# Patient Record
Sex: MALE | Race: OTHER | HISPANIC OR LATINO | Employment: OTHER | URBAN - METROPOLITAN AREA
[De-identification: names, ages, dates, MRNs, and addresses within clinical notes are randomized per-mention and may not be internally consistent; named-entity substitution may affect disease eponyms.]

---

## 2017-01-01 ENCOUNTER — HOSPITAL ENCOUNTER (INPATIENT)
Facility: MEDICAL CENTER | Age: 77
LOS: 3 days | DRG: 378 | End: 2017-03-12
Attending: EMERGENCY MEDICINE | Admitting: INTERNAL MEDICINE
Payer: MEDICARE

## 2017-01-01 ENCOUNTER — HOSPITAL ENCOUNTER (OUTPATIENT)
Facility: MEDICAL CENTER | Age: 77
End: 2017-10-17
Attending: EMERGENCY MEDICINE | Admitting: INTERNAL MEDICINE
Payer: MEDICARE

## 2017-01-01 ENCOUNTER — RESOLUTE PROFESSIONAL BILLING HOSPITAL PROF FEE (OUTPATIENT)
Dept: HOSPITALIST | Facility: MEDICAL CENTER | Age: 77
End: 2017-01-01
Payer: MEDICARE

## 2017-01-01 ENCOUNTER — APPOINTMENT (OUTPATIENT)
Dept: RADIOLOGY | Facility: MEDICAL CENTER | Age: 77
DRG: 378 | End: 2017-01-01
Attending: EMERGENCY MEDICINE
Payer: MEDICARE

## 2017-01-01 ENCOUNTER — APPOINTMENT (OUTPATIENT)
Dept: RADIOLOGY | Facility: MEDICAL CENTER | Age: 77
End: 2017-01-01
Attending: EMERGENCY MEDICINE
Payer: MEDICARE

## 2017-01-01 VITALS
WEIGHT: 122.8 LBS | TEMPERATURE: 98.7 F | DIASTOLIC BLOOD PRESSURE: 49 MMHG | BODY MASS INDEX: 19.73 KG/M2 | SYSTOLIC BLOOD PRESSURE: 84 MMHG | RESPIRATION RATE: 34 BRPM | HEART RATE: 127 BPM | HEIGHT: 66 IN | OXYGEN SATURATION: 54 %

## 2017-01-01 VITALS
SYSTOLIC BLOOD PRESSURE: 109 MMHG | DIASTOLIC BLOOD PRESSURE: 90 MMHG | RESPIRATION RATE: 19 BRPM | HEART RATE: 92 BPM | WEIGHT: 122.8 LBS | BODY MASS INDEX: 19.73 KG/M2 | TEMPERATURE: 97.9 F | OXYGEN SATURATION: 91 % | HEIGHT: 66 IN

## 2017-01-01 DIAGNOSIS — K92.2 UPPER GI BLEEDING: ICD-10-CM

## 2017-01-01 DIAGNOSIS — R73.9 HYPERGLYCEMIA: ICD-10-CM

## 2017-01-01 DIAGNOSIS — N17.9 AKI (ACUTE KIDNEY INJURY) (HCC): ICD-10-CM

## 2017-01-01 DIAGNOSIS — R06.02 SHORTNESS OF BREATH: ICD-10-CM

## 2017-01-01 DIAGNOSIS — R79.89 ELEVATED LACTIC ACID LEVEL: ICD-10-CM

## 2017-01-01 DIAGNOSIS — F02.818 LATE ONSET ALZHEIMER'S DISEASE WITH BEHAVIORAL DISTURBANCE (HCC): ICD-10-CM

## 2017-01-01 DIAGNOSIS — E87.29 INCREASED ANION GAP METABOLIC ACIDOSIS: ICD-10-CM

## 2017-01-01 DIAGNOSIS — D64.89 ANEMIA DUE TO OTHER CAUSE: ICD-10-CM

## 2017-01-01 DIAGNOSIS — J18.9 PNEUMONIA OF RIGHT LOWER LOBE DUE TO INFECTIOUS ORGANISM: ICD-10-CM

## 2017-01-01 DIAGNOSIS — R09.02 HYPOXIA: ICD-10-CM

## 2017-01-01 DIAGNOSIS — K92.2 GASTROINTESTINAL HEMORRHAGE, UNSPECIFIED GASTROINTESTINAL HEMORRHAGE TYPE: ICD-10-CM

## 2017-01-01 DIAGNOSIS — I95.89 OTHER SPECIFIED HYPOTENSION: ICD-10-CM

## 2017-01-01 DIAGNOSIS — D64.9 CHRONIC ANEMIA: ICD-10-CM

## 2017-01-01 DIAGNOSIS — G30.1 LATE ONSET ALZHEIMER'S DISEASE WITH BEHAVIORAL DISTURBANCE (HCC): ICD-10-CM

## 2017-01-01 LAB
ABO GROUP BLD: NORMAL
ALBUMIN SERPL BCP-MCNC: 2.9 G/DL (ref 3.2–4.9)
ALBUMIN SERPL BCP-MCNC: 3.1 G/DL (ref 3.2–4.9)
ALBUMIN SERPL BCP-MCNC: 3.5 G/DL (ref 3.2–4.9)
ALBUMIN SERPL BCP-MCNC: 4.1 G/DL (ref 3.2–4.9)
ALBUMIN/GLOB SERPL: 1 G/DL
ALBUMIN/GLOB SERPL: 1 G/DL
ALBUMIN/GLOB SERPL: 1.1 G/DL
ALBUMIN/GLOB SERPL: 1.1 G/DL
ALP SERPL-CCNC: 64 U/L (ref 30–99)
ALP SERPL-CCNC: 66 U/L (ref 30–99)
ALP SERPL-CCNC: 67 U/L (ref 30–99)
ALP SERPL-CCNC: 76 U/L (ref 30–99)
ALT SERPL-CCNC: 31 U/L (ref 2–50)
ALT SERPL-CCNC: 35 U/L (ref 2–50)
ALT SERPL-CCNC: 38 U/L (ref 2–50)
ALT SERPL-CCNC: 54 U/L (ref 2–50)
ANION GAP SERPL CALC-SCNC: 10 MMOL/L (ref 0–11.9)
ANION GAP SERPL CALC-SCNC: 20 MMOL/L (ref 0–11.9)
ANION GAP SERPL CALC-SCNC: 8 MMOL/L (ref 0–11.9)
ANION GAP SERPL CALC-SCNC: 9 MMOL/L (ref 0–11.9)
ANION GAP SERPL CALC-SCNC: 9 MMOL/L (ref 0–11.9)
ANISOCYTOSIS BLD QL SMEAR: ABNORMAL
AST SERPL-CCNC: 11 U/L (ref 12–45)
AST SERPL-CCNC: 16 U/L (ref 12–45)
AST SERPL-CCNC: 16 U/L (ref 12–45)
AST SERPL-CCNC: 19 U/L (ref 12–45)
BARCODED ABORH UBTYP: 6200
BARCODED PRD CODE UBPRD: NORMAL
BARCODED UNIT NUM UBUNT: NORMAL
BASOPHILS # BLD AUTO: 0 % (ref 0–1.8)
BASOPHILS # BLD AUTO: 0 % (ref 0–1.8)
BASOPHILS # BLD AUTO: 0.7 % (ref 0–1.8)
BASOPHILS # BLD AUTO: 0.9 % (ref 0–1.8)
BASOPHILS # BLD AUTO: 0.9 % (ref 0–1.8)
BASOPHILS # BLD AUTO: 1.1 % (ref 0–1.8)
BASOPHILS # BLD: 0 K/UL (ref 0–0.12)
BASOPHILS # BLD: 0 K/UL (ref 0–0.12)
BASOPHILS # BLD: 0.04 K/UL (ref 0–0.12)
BASOPHILS # BLD: 0.05 K/UL (ref 0–0.12)
BASOPHILS # BLD: 0.06 K/UL (ref 0–0.12)
BASOPHILS # BLD: 0.07 K/UL (ref 0–0.12)
BILIRUB SERPL-MCNC: 0.1 MG/DL (ref 0.1–1.5)
BILIRUB SERPL-MCNC: 0.2 MG/DL (ref 0.1–1.5)
BLD GP AB SCN SERPL QL: NORMAL
BNP SERPL-MCNC: 59 PG/ML (ref 0–100)
BUN SERPL-MCNC: 11 MG/DL (ref 8–22)
BUN SERPL-MCNC: 12 MG/DL (ref 8–22)
BUN SERPL-MCNC: 21 MG/DL (ref 8–22)
BUN SERPL-MCNC: 23 MG/DL (ref 8–22)
BUN SERPL-MCNC: 59 MG/DL (ref 8–22)
CALCIUM SERPL-MCNC: 8.4 MG/DL (ref 8.5–10.5)
CALCIUM SERPL-MCNC: 8.5 MG/DL (ref 8.5–10.5)
CALCIUM SERPL-MCNC: 8.6 MG/DL (ref 8.5–10.5)
CALCIUM SERPL-MCNC: 8.9 MG/DL (ref 8.5–10.5)
CALCIUM SERPL-MCNC: 9.5 MG/DL (ref 8.5–10.5)
CEA SERPL-MCNC: 0.8 NG/ML (ref 0–3)
CHLORIDE SERPL-SCNC: 103 MMOL/L (ref 96–112)
CHLORIDE SERPL-SCNC: 104 MMOL/L (ref 96–112)
CHLORIDE SERPL-SCNC: 106 MMOL/L (ref 96–112)
CHLORIDE SERPL-SCNC: 108 MMOL/L (ref 96–112)
CHLORIDE SERPL-SCNC: 113 MMOL/L (ref 96–112)
CO2 SERPL-SCNC: 13 MMOL/L (ref 20–33)
CO2 SERPL-SCNC: 22 MMOL/L (ref 20–33)
CO2 SERPL-SCNC: 23 MMOL/L (ref 20–33)
CO2 SERPL-SCNC: 24 MMOL/L (ref 20–33)
CO2 SERPL-SCNC: 24 MMOL/L (ref 20–33)
COMPONENT R 8504R: NORMAL
CREAT SERPL-MCNC: 0.54 MG/DL (ref 0.5–1.4)
CREAT SERPL-MCNC: 0.59 MG/DL (ref 0.5–1.4)
CREAT SERPL-MCNC: 0.68 MG/DL (ref 0.5–1.4)
CREAT SERPL-MCNC: 0.69 MG/DL (ref 0.5–1.4)
CREAT SERPL-MCNC: 1.32 MG/DL (ref 0.5–1.4)
EKG IMPRESSION: NORMAL
EOSINOPHIL # BLD AUTO: 0 K/UL (ref 0–0.51)
EOSINOPHIL # BLD AUTO: 0.12 K/UL (ref 0–0.51)
EOSINOPHIL # BLD AUTO: 0.13 K/UL (ref 0–0.51)
EOSINOPHIL # BLD AUTO: 0.15 K/UL (ref 0–0.51)
EOSINOPHIL # BLD AUTO: 0.2 K/UL (ref 0–0.51)
EOSINOPHIL # BLD AUTO: 0.22 K/UL (ref 0–0.51)
EOSINOPHIL NFR BLD: 0 % (ref 0–6.9)
EOSINOPHIL NFR BLD: 1.7 % (ref 0–6.9)
EOSINOPHIL NFR BLD: 2.4 % (ref 0–6.9)
EOSINOPHIL NFR BLD: 2.6 % (ref 0–6.9)
EOSINOPHIL NFR BLD: 2.7 % (ref 0–6.9)
EOSINOPHIL NFR BLD: 4.2 % (ref 0–6.9)
ERYTHROCYTE [DISTWIDTH] IN BLOOD BY AUTOMATED COUNT: 46.5 FL (ref 35.9–50)
ERYTHROCYTE [DISTWIDTH] IN BLOOD BY AUTOMATED COUNT: 46.5 FL (ref 35.9–50)
ERYTHROCYTE [DISTWIDTH] IN BLOOD BY AUTOMATED COUNT: 47.3 FL (ref 35.9–50)
ERYTHROCYTE [DISTWIDTH] IN BLOOD BY AUTOMATED COUNT: 47.3 FL (ref 35.9–50)
ERYTHROCYTE [DISTWIDTH] IN BLOOD BY AUTOMATED COUNT: 47.5 FL (ref 35.9–50)
ERYTHROCYTE [DISTWIDTH] IN BLOOD BY AUTOMATED COUNT: 48.1 FL (ref 35.9–50)
ERYTHROCYTE [DISTWIDTH] IN BLOOD BY AUTOMATED COUNT: 56.3 FL (ref 35.9–50)
EST. AVERAGE GLUCOSE BLD GHB EST-MCNC: 123 MG/DL
FERRITIN SERPL-MCNC: 6.6 NG/ML (ref 22–322)
GFR SERPL CREATININE-BSD FRML MDRD: 53 ML/MIN/1.73 M 2
GFR SERPL CREATININE-BSD FRML MDRD: >60 ML/MIN/1.73 M 2
GLOBULIN SER CALC-MCNC: 2.7 G/DL (ref 1.9–3.5)
GLOBULIN SER CALC-MCNC: 2.9 G/DL (ref 1.9–3.5)
GLOBULIN SER CALC-MCNC: 3.5 G/DL (ref 1.9–3.5)
GLOBULIN SER CALC-MCNC: 4.1 G/DL (ref 1.9–3.5)
GLUCOSE BLD-MCNC: 102 MG/DL (ref 65–99)
GLUCOSE BLD-MCNC: 115 MG/DL (ref 65–99)
GLUCOSE BLD-MCNC: 116 MG/DL (ref 65–99)
GLUCOSE BLD-MCNC: 170 MG/DL (ref 65–99)
GLUCOSE BLD-MCNC: 93 MG/DL (ref 65–99)
GLUCOSE BLD-MCNC: 94 MG/DL (ref 65–99)
GLUCOSE BLD-MCNC: 99 MG/DL (ref 65–99)
GLUCOSE BLD-MCNC: 99 MG/DL (ref 65–99)
GLUCOSE SERPL-MCNC: 103 MG/DL (ref 65–99)
GLUCOSE SERPL-MCNC: 109 MG/DL (ref 65–99)
GLUCOSE SERPL-MCNC: 142 MG/DL (ref 65–99)
GLUCOSE SERPL-MCNC: 419 MG/DL (ref 65–99)
GLUCOSE SERPL-MCNC: 86 MG/DL (ref 65–99)
HBA1C MFR BLD: 5.9 % (ref 0–5.6)
HCT VFR BLD AUTO: 22.4 % (ref 42–52)
HCT VFR BLD AUTO: 22.7 % (ref 42–52)
HCT VFR BLD AUTO: 22.8 % (ref 42–52)
HCT VFR BLD AUTO: 26.8 % (ref 42–52)
HCT VFR BLD AUTO: 27.4 % (ref 42–52)
HCT VFR BLD AUTO: 27.4 % (ref 42–52)
HCT VFR BLD AUTO: 28.4 % (ref 42–52)
HCT VFR BLD AUTO: 28.5 % (ref 42–52)
HGB BLD-MCNC: 6.2 G/DL (ref 14–18)
HGB BLD-MCNC: 6.4 G/DL (ref 14–18)
HGB BLD-MCNC: 6.4 G/DL (ref 14–18)
HGB BLD-MCNC: 7.4 G/DL (ref 14–18)
HGB BLD-MCNC: 7.6 G/DL (ref 14–18)
HGB BLD-MCNC: 7.6 G/DL (ref 14–18)
HGB BLD-MCNC: 7.7 G/DL (ref 14–18)
HGB BLD-MCNC: 8.4 G/DL (ref 14–18)
HGB RETIC QN AUTO: 18.8 PG/CELL (ref 29–35)
HYPOCHROMIA BLD QL SMEAR: ABNORMAL
HYPOCHROMIA BLD QL SMEAR: ABNORMAL
IMM GRANULOCYTES # BLD AUTO: 0.01 K/UL (ref 0–0.11)
IMM GRANULOCYTES # BLD AUTO: 0.02 K/UL (ref 0–0.11)
IMM GRANULOCYTES # BLD AUTO: 0.02 K/UL (ref 0–0.11)
IMM GRANULOCYTES # BLD AUTO: 0.05 K/UL (ref 0–0.11)
IMM GRANULOCYTES NFR BLD AUTO: 0.2 % (ref 0–0.9)
IMM GRANULOCYTES NFR BLD AUTO: 0.4 % (ref 0–0.9)
IMM GRANULOCYTES NFR BLD AUTO: 0.4 % (ref 0–0.9)
IMM GRANULOCYTES NFR BLD AUTO: 0.6 % (ref 0–0.9)
IMM RETICS NFR: 24.4 % (ref 9.3–17.4)
IRON SATN MFR SERPL: ABNORMAL % (ref 15–55)
IRON SERPL-MCNC: <10 UG/DL (ref 50–180)
LACTATE BLD-SCNC: 12.7 MMOL/L (ref 0.5–2)
LG PLATELETS BLD QL SMEAR: NORMAL
LYMPHOCYTES # BLD AUTO: 1.15 K/UL (ref 1–4.8)
LYMPHOCYTES # BLD AUTO: 1.29 K/UL (ref 1–4.8)
LYMPHOCYTES # BLD AUTO: 1.3 K/UL (ref 1–4.8)
LYMPHOCYTES # BLD AUTO: 1.33 K/UL (ref 1–4.8)
LYMPHOCYTES # BLD AUTO: 1.46 K/UL (ref 1–4.8)
LYMPHOCYTES # BLD AUTO: 2.36 K/UL (ref 1–4.8)
LYMPHOCYTES NFR BLD: 14.9 % (ref 22–41)
LYMPHOCYTES NFR BLD: 19.1 % (ref 22–41)
LYMPHOCYTES NFR BLD: 20.2 % (ref 22–41)
LYMPHOCYTES NFR BLD: 23.6 % (ref 22–41)
LYMPHOCYTES NFR BLD: 24 % (ref 22–41)
LYMPHOCYTES NFR BLD: 27.5 % (ref 22–41)
MACROCYTES BLD QL SMEAR: ABNORMAL
MAGNESIUM SERPL-MCNC: 2.1 MG/DL (ref 1.5–2.5)
MAGNESIUM SERPL-MCNC: 2.8 MG/DL (ref 1.5–2.5)
MANUAL DIFF BLD: ABNORMAL
MANUAL DIFF BLD: NORMAL
MCH RBC QN AUTO: 20.3 PG (ref 27–33)
MCH RBC QN AUTO: 20.4 PG (ref 27–33)
MCH RBC QN AUTO: 20.5 PG (ref 27–33)
MCH RBC QN AUTO: 20.7 PG (ref 27–33)
MCH RBC QN AUTO: 21 PG (ref 27–33)
MCH RBC QN AUTO: 22.1 PG (ref 27–33)
MCH RBC QN AUTO: 26.8 PG (ref 27–33)
MCHC RBC AUTO-ENTMCNC: 27 G/DL (ref 33.7–35.3)
MCHC RBC AUTO-ENTMCNC: 27 G/DL (ref 33.7–35.3)
MCHC RBC AUTO-ENTMCNC: 27.6 G/DL (ref 33.7–35.3)
MCHC RBC AUTO-ENTMCNC: 27.7 G/DL (ref 33.7–35.3)
MCHC RBC AUTO-ENTMCNC: 28.1 G/DL (ref 33.7–35.3)
MCHC RBC AUTO-ENTMCNC: 28.2 G/DL (ref 33.7–35.3)
MCHC RBC AUTO-ENTMCNC: 29.6 G/DL (ref 33.7–35.3)
MCV RBC AUTO: 73.7 FL (ref 81.4–97.8)
MCV RBC AUTO: 73.8 FL (ref 81.4–97.8)
MCV RBC AUTO: 74.2 FL (ref 81.4–97.8)
MCV RBC AUTO: 74.4 FL (ref 81.4–97.8)
MCV RBC AUTO: 74.7 FL (ref 81.4–97.8)
MCV RBC AUTO: 75.1 FL (ref 81.4–97.8)
MCV RBC AUTO: 99.3 FL (ref 81.4–97.8)
METAMYELOCYTES NFR BLD MANUAL: 0.9 %
MICROCYTES BLD QL SMEAR: ABNORMAL
MONOCYTES # BLD AUTO: 0.24 K/UL (ref 0–0.85)
MONOCYTES # BLD AUTO: 0.51 K/UL (ref 0–0.85)
MONOCYTES # BLD AUTO: 0.66 K/UL (ref 0–0.85)
MONOCYTES # BLD AUTO: 0.75 K/UL (ref 0–0.85)
MONOCYTES # BLD AUTO: 0.8 K/UL (ref 0–0.85)
MONOCYTES # BLD AUTO: 0.96 K/UL (ref 0–0.85)
MONOCYTES NFR BLD AUTO: 12.5 % (ref 0–13.4)
MONOCYTES NFR BLD AUTO: 12.5 % (ref 0–13.4)
MONOCYTES NFR BLD AUTO: 13.5 % (ref 0–13.4)
MONOCYTES NFR BLD AUTO: 14.6 % (ref 0–13.4)
MONOCYTES NFR BLD AUTO: 3.5 % (ref 0–13.4)
MONOCYTES NFR BLD AUTO: 4.4 % (ref 0–13.4)
MORPHOLOGY BLD-IMP: NORMAL
MORPHOLOGY BLD-IMP: NORMAL
NEUTROPHILS # BLD AUTO: 2.89 K/UL (ref 1.82–7.42)
NEUTROPHILS # BLD AUTO: 3.17 K/UL (ref 1.82–7.42)
NEUTROPHILS # BLD AUTO: 3.27 K/UL (ref 1.82–7.42)
NEUTROPHILS # BLD AUTO: 5.15 K/UL (ref 1.82–7.42)
NEUTROPHILS # BLD AUTO: 5.28 K/UL (ref 1.82–7.42)
NEUTROPHILS # BLD AUTO: 8.72 K/UL (ref 1.82–7.42)
NEUTROPHILS NFR BLD: 54.5 % (ref 44–72)
NEUTROPHILS NFR BLD: 57.9 % (ref 44–72)
NEUTROPHILS NFR BLD: 58.9 % (ref 44–72)
NEUTROPHILS NFR BLD: 64 % (ref 44–72)
NEUTROPHILS NFR BLD: 68.5 % (ref 44–72)
NEUTROPHILS NFR BLD: 75.7 % (ref 44–72)
NEUTS BAND NFR BLD MANUAL: 10.5 % (ref 0–10)
NRBC # BLD AUTO: 0 K/UL
NRBC # BLD AUTO: 0.12 K/UL
NRBC BLD AUTO-RTO: 0 /100 WBC
NRBC BLD AUTO-RTO: 1 /100 WBC
OVALOCYTES BLD QL SMEAR: NORMAL
OVALOCYTES BLD QL SMEAR: NORMAL
PHENYTOIN SERPL-MCNC: 2 UG/ML (ref 10–20)
PHOSPHATE SERPL-MCNC: 2.7 MG/DL (ref 2.5–4.5)
PHOSPHATE SERPL-MCNC: 3.6 MG/DL (ref 2.5–4.5)
PLATELET # BLD AUTO: 276 K/UL (ref 164–446)
PLATELET # BLD AUTO: 278 K/UL (ref 164–446)
PLATELET # BLD AUTO: 280 K/UL (ref 164–446)
PLATELET # BLD AUTO: 294 K/UL (ref 164–446)
PLATELET # BLD AUTO: 299 K/UL (ref 164–446)
PLATELET # BLD AUTO: 304 K/UL (ref 164–446)
PLATELET # BLD AUTO: 328 K/UL (ref 164–446)
PLATELET BLD QL SMEAR: NORMAL
PLATELET BLD QL SMEAR: NORMAL
PMV BLD AUTO: 10.5 FL (ref 9–12.9)
PMV BLD AUTO: 12.6 FL (ref 9–12.9)
PMV BLD AUTO: 9.5 FL (ref 9–12.9)
PMV BLD AUTO: 9.7 FL (ref 9–12.9)
PMV BLD AUTO: 9.7 FL (ref 9–12.9)
PMV BLD AUTO: 9.8 FL (ref 9–12.9)
PMV BLD AUTO: 9.8 FL (ref 9–12.9)
POIKILOCYTOSIS BLD QL SMEAR: NORMAL
POIKILOCYTOSIS BLD QL SMEAR: NORMAL
POTASSIUM SERPL-SCNC: 3.8 MMOL/L (ref 3.6–5.5)
POTASSIUM SERPL-SCNC: 4 MMOL/L (ref 3.6–5.5)
POTASSIUM SERPL-SCNC: 4.4 MMOL/L (ref 3.6–5.5)
POTASSIUM SERPL-SCNC: 4.5 MMOL/L (ref 3.6–5.5)
POTASSIUM SERPL-SCNC: 4.6 MMOL/L (ref 3.6–5.5)
PRODUCT TYPE UPROD: NORMAL
PROT SERPL-MCNC: 5.6 G/DL (ref 6–8.2)
PROT SERPL-MCNC: 6 G/DL (ref 6–8.2)
PROT SERPL-MCNC: 7 G/DL (ref 6–8.2)
PROT SERPL-MCNC: 8.2 G/DL (ref 6–8.2)
RBC # BLD AUTO: 2.87 M/UL (ref 4.7–6.1)
RBC # BLD AUTO: 3.04 M/UL (ref 4.7–6.1)
RBC # BLD AUTO: 3.05 M/UL (ref 4.7–6.1)
RBC # BLD AUTO: 3.09 M/UL (ref 4.7–6.1)
RBC # BLD AUTO: 3.61 M/UL (ref 4.7–6.1)
RBC # BLD AUTO: 3.74 M/UL (ref 4.7–6.1)
RBC # BLD AUTO: 3.8 M/UL (ref 4.7–6.1)
RBC BLD AUTO: PRESENT
RBC BLD AUTO: PRESENT
RETICS # AUTO: 0.05 M/UL (ref 0.04–0.06)
RETICS/RBC NFR: 1.7 % (ref 0.8–2.1)
RH BLD: NORMAL
SCHISTOCYTES BLD QL SMEAR: NORMAL
SCHISTOCYTES BLD QL SMEAR: NORMAL
SODIUM SERPL-SCNC: 136 MMOL/L (ref 135–145)
SODIUM SERPL-SCNC: 137 MMOL/L (ref 135–145)
SODIUM SERPL-SCNC: 138 MMOL/L (ref 135–145)
SODIUM SERPL-SCNC: 139 MMOL/L (ref 135–145)
SODIUM SERPL-SCNC: 146 MMOL/L (ref 135–145)
TIBC SERPL-MCNC: 370 UG/DL (ref 250–450)
TROPONIN I SERPL-MCNC: 0.02 NG/ML (ref 0–0.04)
UNIT STATUS USTAT: NORMAL
WBC # BLD AUTO: 11.7 K/UL (ref 4.8–10.8)
WBC # BLD AUTO: 5.3 K/UL (ref 4.8–10.8)
WBC # BLD AUTO: 5.5 K/UL (ref 4.8–10.8)
WBC # BLD AUTO: 5.6 K/UL (ref 4.8–10.8)
WBC # BLD AUTO: 5.9 K/UL (ref 4.8–10.8)
WBC # BLD AUTO: 6.8 K/UL (ref 4.8–10.8)
WBC # BLD AUTO: 7.7 K/UL (ref 4.8–10.8)

## 2017-01-01 PROCEDURE — 36415 COLL VENOUS BLD VENIPUNCTURE: CPT

## 2017-01-01 PROCEDURE — 80053 COMPREHEN METABOLIC PANEL: CPT

## 2017-01-01 PROCEDURE — 82728 ASSAY OF FERRITIN: CPT

## 2017-01-01 PROCEDURE — 83605 ASSAY OF LACTIC ACID: CPT

## 2017-01-01 PROCEDURE — 86923 COMPATIBILITY TEST ELECTRIC: CPT

## 2017-01-01 PROCEDURE — 71010 DX-CHEST-LIMITED (1 VIEW): CPT

## 2017-01-01 PROCEDURE — 85027 COMPLETE CBC AUTOMATED: CPT

## 2017-01-01 PROCEDURE — 85025 COMPLETE CBC W/AUTO DIFF WBC: CPT

## 2017-01-01 PROCEDURE — C9113 INJ PANTOPRAZOLE SODIUM, VIA: HCPCS | Performed by: INTERNAL MEDICINE

## 2017-01-01 PROCEDURE — 700105 HCHG RX REV CODE 258: Performed by: INTERNAL MEDICINE

## 2017-01-01 PROCEDURE — 700102 HCHG RX REV CODE 250 W/ 637 OVERRIDE(OP): Performed by: INTERNAL MEDICINE

## 2017-01-01 PROCEDURE — G0378 HOSPITAL OBSERVATION PER HR: HCPCS

## 2017-01-01 PROCEDURE — 99223 1ST HOSP IP/OBS HIGH 75: CPT | Mod: AI | Performed by: INTERNAL MEDICINE

## 2017-01-01 PROCEDURE — 700102 HCHG RX REV CODE 250 W/ 637 OVERRIDE(OP)

## 2017-01-01 PROCEDURE — 84484 ASSAY OF TROPONIN QUANT: CPT

## 2017-01-01 PROCEDURE — 86850 RBC ANTIBODY SCREEN: CPT

## 2017-01-01 PROCEDURE — 83880 ASSAY OF NATRIURETIC PEPTIDE: CPT

## 2017-01-01 PROCEDURE — 700105 HCHG RX REV CODE 258: Performed by: EMERGENCY MEDICINE

## 2017-01-01 PROCEDURE — 304561 HCHG STAT O2

## 2017-01-01 PROCEDURE — 83735 ASSAY OF MAGNESIUM: CPT

## 2017-01-01 PROCEDURE — 82378 CARCINOEMBRYONIC ANTIGEN: CPT

## 2017-01-01 PROCEDURE — 160002 HCHG RECOVERY MINUTES (STAT): Performed by: INTERNAL MEDICINE

## 2017-01-01 PROCEDURE — A9270 NON-COVERED ITEM OR SERVICE: HCPCS | Performed by: INTERNAL MEDICINE

## 2017-01-01 PROCEDURE — 770020 HCHG ROOM/CARE - TELE (206)

## 2017-01-01 PROCEDURE — 85007 BL SMEAR W/DIFF WBC COUNT: CPT

## 2017-01-01 PROCEDURE — 160035 HCHG PACU - 1ST 60 MINS PHASE I: Performed by: INTERNAL MEDICINE

## 2017-01-01 PROCEDURE — 160048 HCHG OR STATISTICAL LEVEL 1-5: Performed by: INTERNAL MEDICINE

## 2017-01-01 PROCEDURE — 36430 TRANSFUSION BLD/BLD COMPNT: CPT

## 2017-01-01 PROCEDURE — 86900 BLOOD TYPING SEROLOGIC ABO: CPT

## 2017-01-01 PROCEDURE — 83550 IRON BINDING TEST: CPT

## 2017-01-01 PROCEDURE — 700111 HCHG RX REV CODE 636 W/ 250 OVERRIDE (IP)

## 2017-01-01 PROCEDURE — 31720 CLEARANCE OF AIRWAYS: CPT

## 2017-01-01 PROCEDURE — 83036 HEMOGLOBIN GLYCOSYLATED A1C: CPT

## 2017-01-01 PROCEDURE — 700105 HCHG RX REV CODE 258

## 2017-01-01 PROCEDURE — 71010 DX-CHEST-PORTABLE (1 VIEW): CPT

## 2017-01-01 PROCEDURE — P9016 RBC LEUKOCYTES REDUCED: HCPCS

## 2017-01-01 PROCEDURE — A9270 NON-COVERED ITEM OR SERVICE: HCPCS

## 2017-01-01 PROCEDURE — 99238 HOSP IP/OBS DSCHRG MGMT 30/<: CPT | Mod: GC | Performed by: INTERNAL MEDICINE

## 2017-01-01 PROCEDURE — 83540 ASSAY OF IRON: CPT

## 2017-01-01 PROCEDURE — 93005 ELECTROCARDIOGRAM TRACING: CPT | Performed by: INTERNAL MEDICINE

## 2017-01-01 PROCEDURE — 80048 BASIC METABOLIC PNL TOTAL CA: CPT

## 2017-01-01 PROCEDURE — 99152 MOD SED SAME PHYS/QHP 5/>YRS: CPT | Performed by: INTERNAL MEDICINE

## 2017-01-01 PROCEDURE — 500066 HCHG BITE BLOCK, ECT: Performed by: INTERNAL MEDICINE

## 2017-01-01 PROCEDURE — 96375 TX/PRO/DX INJ NEW DRUG ADDON: CPT | Mod: XU

## 2017-01-01 PROCEDURE — 30233N1 TRANSFUSION OF NONAUTOLOGOUS RED BLOOD CELLS INTO PERIPHERAL VEIN, PERCUTANEOUS APPROACH: ICD-10-PCS | Performed by: INTERNAL MEDICINE

## 2017-01-01 PROCEDURE — 84100 ASSAY OF PHOSPHORUS: CPT

## 2017-01-01 PROCEDURE — 700111 HCHG RX REV CODE 636 W/ 250 OVERRIDE (IP): Performed by: INTERNAL MEDICINE

## 2017-01-01 PROCEDURE — 85046 RETICYTE/HGB CONCENTRATE: CPT

## 2017-01-01 PROCEDURE — 99232 SBSQ HOSP IP/OBS MODERATE 35: CPT | Mod: GC | Performed by: INTERNAL MEDICINE

## 2017-01-01 PROCEDURE — 80185 ASSAY OF PHENYTOIN TOTAL: CPT

## 2017-01-01 PROCEDURE — 0DJ08ZZ INSPECTION OF UPPER INTESTINAL TRACT, VIA NATURAL OR ARTIFICIAL OPENING ENDOSCOPIC: ICD-10-PCS | Performed by: INTERNAL MEDICINE

## 2017-01-01 PROCEDURE — 85025 COMPLETE CBC W/AUTO DIFF WBC: CPT | Mod: 91

## 2017-01-01 PROCEDURE — 93010 ELECTROCARDIOGRAM REPORT: CPT | Performed by: INTERNAL MEDICINE

## 2017-01-01 PROCEDURE — 82962 GLUCOSE BLOOD TEST: CPT

## 2017-01-01 PROCEDURE — 87040 BLOOD CULTURE FOR BACTERIA: CPT

## 2017-01-01 PROCEDURE — 94760 N-INVAS EAR/PLS OXIMETRY 1: CPT

## 2017-01-01 PROCEDURE — 160203 HCHG ENDO MINUTES - 1ST 30 MINS LEVEL 4: Performed by: INTERNAL MEDICINE

## 2017-01-01 PROCEDURE — 82962 GLUCOSE BLOOD TEST: CPT | Mod: 91

## 2017-01-01 PROCEDURE — 99291 CRITICAL CARE FIRST HOUR: CPT

## 2017-01-01 PROCEDURE — 94640 AIRWAY INHALATION TREATMENT: CPT

## 2017-01-01 PROCEDURE — 99285 EMERGENCY DEPT VISIT HI MDM: CPT

## 2017-01-01 PROCEDURE — 86901 BLOOD TYPING SEROLOGIC RH(D): CPT

## 2017-01-01 PROCEDURE — 700111 HCHG RX REV CODE 636 W/ 250 OVERRIDE (IP): Performed by: EMERGENCY MEDICINE

## 2017-01-01 PROCEDURE — 96365 THER/PROPH/DIAG IV INF INIT: CPT | Mod: XU

## 2017-01-01 RX ORDER — CHOLECALCIFEROL (VITAMIN D3) 125 MCG
500 CAPSULE ORAL DAILY
Status: DISCONTINUED | OUTPATIENT
Start: 2017-01-01 | End: 2017-01-01 | Stop reason: HOSPADM

## 2017-01-01 RX ORDER — ATROPINE SULFATE 10 MG/ML
2 SOLUTION/ DROPS OPHTHALMIC EVERY 4 HOURS PRN
Status: DISCONTINUED | OUTPATIENT
Start: 2017-01-01 | End: 2017-01-01 | Stop reason: HOSPADM

## 2017-01-01 RX ORDER — FOLIC ACID 1 MG/1
1 TABLET ORAL DAILY
Status: DISCONTINUED | OUTPATIENT
Start: 2017-01-01 | End: 2017-01-01 | Stop reason: HOSPADM

## 2017-01-01 RX ORDER — LORAZEPAM 2 MG/ML
1 INJECTION INTRAMUSCULAR
Status: DISCONTINUED | OUTPATIENT
Start: 2017-01-01 | End: 2017-01-01

## 2017-01-01 RX ORDER — PHENYTOIN SODIUM 100 MG/1
100 CAPSULE, EXTENDED RELEASE ORAL 2 TIMES DAILY
COMMUNITY

## 2017-01-01 RX ORDER — MORPHINE SULFATE 10 MG/ML
5 INJECTION, SOLUTION INTRAMUSCULAR; INTRAVENOUS
Status: DISCONTINUED | OUTPATIENT
Start: 2017-01-01 | End: 2017-01-01 | Stop reason: HOSPADM

## 2017-01-01 RX ORDER — LORAZEPAM 2 MG/ML
1 INJECTION INTRAMUSCULAR
Status: DISCONTINUED | OUTPATIENT
Start: 2017-01-01 | End: 2017-01-01 | Stop reason: HOSPADM

## 2017-01-01 RX ORDER — DIVALPROEX SODIUM 125 MG/1
125 CAPSULE, COATED PELLETS ORAL 3 TIMES DAILY
COMMUNITY
End: 2017-01-01

## 2017-01-01 RX ORDER — DIPHENHYDRAMINE HYDROCHLORIDE 50 MG/ML
25 INJECTION INTRAMUSCULAR; INTRAVENOUS ONCE
Status: COMPLETED | OUTPATIENT
Start: 2017-01-01 | End: 2017-01-01

## 2017-01-01 RX ORDER — LORAZEPAM 2 MG/ML
1 CONCENTRATE ORAL
Status: DISCONTINUED | OUTPATIENT
Start: 2017-01-01 | End: 2017-01-01

## 2017-01-01 RX ORDER — FERROUS SULFATE 325(65) MG
325 TABLET ORAL DAILY
COMMUNITY
End: 2017-01-01

## 2017-01-01 RX ORDER — TRAZODONE HYDROCHLORIDE 100 MG/1
100 TABLET ORAL
COMMUNITY

## 2017-01-01 RX ORDER — LISINOPRIL 20 MG/1
40 TABLET ORAL DAILY
Status: DISCONTINUED | OUTPATIENT
Start: 2017-01-01 | End: 2017-01-01 | Stop reason: HOSPADM

## 2017-01-01 RX ORDER — ACETAMINOPHEN 325 MG/1
650 TABLET ORAL EVERY 6 HOURS PRN
Status: DISCONTINUED | OUTPATIENT
Start: 2017-01-01 | End: 2017-01-01 | Stop reason: HOSPADM

## 2017-01-01 RX ORDER — LORAZEPAM 2 MG/ML
1 CONCENTRATE ORAL
Status: DISCONTINUED | OUTPATIENT
Start: 2017-01-01 | End: 2017-01-01 | Stop reason: HOSPADM

## 2017-01-01 RX ORDER — OMEPRAZOLE 20 MG/1
20 CAPSULE, DELAYED RELEASE ORAL DAILY
COMMUNITY
End: 2017-01-01

## 2017-01-01 RX ORDER — DIVALPROEX SODIUM 125 MG/1
125 CAPSULE, COATED PELLETS ORAL 3 TIMES DAILY
Status: DISCONTINUED | OUTPATIENT
Start: 2017-01-01 | End: 2017-01-01 | Stop reason: HOSPADM

## 2017-01-01 RX ORDER — MORPHINE SULFATE 4 MG/ML
4 INJECTION, SOLUTION INTRAMUSCULAR; INTRAVENOUS ONCE
Status: DISCONTINUED | OUTPATIENT
Start: 2017-01-01 | End: 2017-01-01

## 2017-01-01 RX ORDER — OMEPRAZOLE 20 MG/1
20 CAPSULE, DELAYED RELEASE ORAL 2 TIMES DAILY
Status: DISCONTINUED | OUTPATIENT
Start: 2017-01-01 | End: 2017-01-01 | Stop reason: HOSPADM

## 2017-01-01 RX ORDER — FOLIC ACID 1 MG/1
1 TABLET ORAL DAILY
COMMUNITY
End: 2017-01-01

## 2017-01-01 RX ORDER — FERROUS SULFATE 325(65) MG
325 TABLET ORAL DAILY
Status: DISCONTINUED | OUTPATIENT
Start: 2017-01-01 | End: 2017-01-01

## 2017-01-01 RX ORDER — SODIUM CHLORIDE 9 MG/ML
500 INJECTION, SOLUTION INTRAVENOUS PRN
Status: DISCONTINUED | OUTPATIENT
Start: 2017-01-01 | End: 2017-01-01 | Stop reason: HOSPADM

## 2017-01-01 RX ORDER — IPRATROPIUM BROMIDE AND ALBUTEROL SULFATE 2.5; .5 MG/3ML; MG/3ML
3 SOLUTION RESPIRATORY (INHALATION)
Status: DISCONTINUED | OUTPATIENT
Start: 2017-01-01 | End: 2017-01-01 | Stop reason: HOSPADM

## 2017-01-01 RX ORDER — ONDANSETRON 4 MG/1
4 TABLET, ORALLY DISINTEGRATING ORAL EVERY 4 HOURS PRN
Status: DISCONTINUED | OUTPATIENT
Start: 2017-01-01 | End: 2017-01-01 | Stop reason: HOSPADM

## 2017-01-01 RX ORDER — HALOPERIDOL 0.5 MG/1
0.5 TABLET ORAL EVERY 8 HOURS PRN
Status: DISCONTINUED | OUTPATIENT
Start: 2017-01-01 | End: 2017-01-01 | Stop reason: HOSPADM

## 2017-01-01 RX ORDER — SODIUM CHLORIDE 9 MG/ML
INJECTION, SOLUTION INTRAVENOUS CONTINUOUS
Status: DISCONTINUED | OUTPATIENT
Start: 2017-01-01 | End: 2017-01-01

## 2017-01-01 RX ORDER — MAGNESIUM OXIDE 400 MG/1
400 TABLET ORAL 2 TIMES DAILY
COMMUNITY

## 2017-01-01 RX ORDER — ONDANSETRON 2 MG/ML
4 INJECTION INTRAMUSCULAR; INTRAVENOUS EVERY 4 HOURS PRN
Status: DISCONTINUED | OUTPATIENT
Start: 2017-01-01 | End: 2017-01-01 | Stop reason: HOSPADM

## 2017-01-01 RX ORDER — MIDAZOLAM HYDROCHLORIDE 1 MG/ML
INJECTION INTRAMUSCULAR; INTRAVENOUS
Status: DISCONTINUED | OUTPATIENT
Start: 2017-01-01 | End: 2017-01-01 | Stop reason: HOSPADM

## 2017-01-01 RX ORDER — MORPHINE SULFATE 10 MG/ML
10 INJECTION, SOLUTION INTRAMUSCULAR; INTRAVENOUS
Status: DISCONTINUED | OUTPATIENT
Start: 2017-01-01 | End: 2017-01-01 | Stop reason: HOSPADM

## 2017-01-01 RX ORDER — CHOLECALCIFEROL (VITAMIN D3) 125 MCG
500 CAPSULE ORAL DAILY
COMMUNITY
End: 2017-01-01

## 2017-01-01 RX ORDER — SODIUM CHLORIDE, SODIUM LACTATE, POTASSIUM CHLORIDE, CALCIUM CHLORIDE 600; 310; 30; 20 MG/100ML; MG/100ML; MG/100ML; MG/100ML
1000 INJECTION, SOLUTION INTRAVENOUS ONCE
Status: COMPLETED | OUTPATIENT
Start: 2017-01-01 | End: 2017-01-01

## 2017-01-01 RX ORDER — PHENYTOIN SODIUM 100 MG/1
100 CAPSULE, EXTENDED RELEASE ORAL DAILY
Status: DISCONTINUED | OUTPATIENT
Start: 2017-01-01 | End: 2017-01-01 | Stop reason: HOSPADM

## 2017-01-01 RX ORDER — DEXTROSE MONOHYDRATE 25 G/50ML
25 INJECTION, SOLUTION INTRAVENOUS
Status: DISCONTINUED | OUTPATIENT
Start: 2017-01-01 | End: 2017-01-01 | Stop reason: HOSPADM

## 2017-01-01 RX ORDER — AMOXICILLIN 250 MG
1 CAPSULE ORAL
COMMUNITY
End: 2017-01-01

## 2017-01-01 RX ORDER — CEFTRIAXONE 1 G/1
1 INJECTION, POWDER, FOR SOLUTION INTRAMUSCULAR; INTRAVENOUS ONCE
COMMUNITY

## 2017-01-01 RX ORDER — IPRATROPIUM BROMIDE AND ALBUTEROL SULFATE 2.5; .5 MG/3ML; MG/3ML
3 SOLUTION RESPIRATORY (INHALATION) EVERY 4 HOURS PRN
Status: SHIPPED | DISCHARGE
Start: 2017-01-01 | End: 2017-01-01

## 2017-01-01 RX ORDER — MORPHINE SULFATE 100 MG/5ML
10 SOLUTION ORAL
Status: DISCONTINUED | OUTPATIENT
Start: 2017-01-01 | End: 2017-01-01 | Stop reason: HOSPADM

## 2017-01-01 RX ORDER — AMLODIPINE BESYLATE 5 MG/1
5 TABLET ORAL DAILY
Status: DISCONTINUED | OUTPATIENT
Start: 2017-01-01 | End: 2017-01-01 | Stop reason: HOSPADM

## 2017-01-01 RX ORDER — TRAZODONE HYDROCHLORIDE 50 MG/1
100 TABLET ORAL NIGHTLY
Status: DISCONTINUED | OUTPATIENT
Start: 2017-01-01 | End: 2017-01-01 | Stop reason: HOSPADM

## 2017-01-01 RX ORDER — ACETAMINOPHEN 325 MG/1
650 TABLET ORAL ONCE
Status: COMPLETED | OUTPATIENT
Start: 2017-01-01 | End: 2017-01-01

## 2017-01-01 RX ORDER — PANTOPRAZOLE SODIUM 40 MG/10ML
40 INJECTION, POWDER, LYOPHILIZED, FOR SOLUTION INTRAVENOUS 2 TIMES DAILY
Status: DISCONTINUED | OUTPATIENT
Start: 2017-01-01 | End: 2017-01-01

## 2017-01-01 RX ORDER — DIPHENHYDRAMINE HCL 25 MG
25 TABLET ORAL ONCE
Status: COMPLETED | OUTPATIENT
Start: 2017-01-01 | End: 2017-01-01

## 2017-01-01 RX ORDER — HALOPERIDOL 0.5 MG/1
0.5 TABLET ORAL EVERY 8 HOURS PRN
Qty: 100 TAB | Refills: 0 | Status: SHIPPED | DISCHARGE
Start: 2017-01-01 | End: 2017-01-01

## 2017-01-01 RX ADMIN — FOLIC ACID 1 MG: 1 TABLET ORAL at 09:00

## 2017-01-01 RX ADMIN — IRON DEXTRAN 1575 MG: 50 INJECTION INTRAMUSCULAR; INTRAVENOUS at 20:21

## 2017-01-01 RX ADMIN — DIVALPROEX SODIUM 125 MG: 125 CAPSULE, COATED PELLETS ORAL at 10:59

## 2017-01-01 RX ADMIN — SERTRALINE 50 MG: 50 TABLET, FILM COATED ORAL at 09:05

## 2017-01-01 RX ADMIN — PANTOPRAZOLE SODIUM 40 MG: 40 INJECTION, POWDER, FOR SOLUTION INTRAVENOUS at 09:59

## 2017-01-01 RX ADMIN — FOLIC ACID 1 MG: 1 TABLET ORAL at 09:06

## 2017-01-01 RX ADMIN — TRAZODONE HYDROCHLORIDE 100 MG: 50 TABLET ORAL at 21:56

## 2017-01-01 RX ADMIN — SODIUM CHLORIDE, SODIUM LACTATE, POTASSIUM CHLORIDE, AND CALCIUM CHLORIDE 1000 ML: 600; 310; 30; 20 INJECTION, SOLUTION INTRAVENOUS at 22:36

## 2017-01-01 RX ADMIN — AMLODIPINE BESYLATE 5 MG: 5 TABLET ORAL at 09:00

## 2017-01-01 RX ADMIN — VANCOMYCIN HYDROCHLORIDE 1400 MG: 100 INJECTION, POWDER, LYOPHILIZED, FOR SOLUTION INTRAVENOUS at 23:16

## 2017-01-01 RX ADMIN — CYANOCOBALAMIN TAB 500 MCG 500 MCG: 500 TAB at 09:00

## 2017-01-01 RX ADMIN — PANTOPRAZOLE SODIUM 40 MG: 40 INJECTION, POWDER, FOR SOLUTION INTRAVENOUS at 10:52

## 2017-01-01 RX ADMIN — DIVALPROEX SODIUM 125 MG: 125 CAPSULE, COATED PELLETS ORAL at 17:20

## 2017-01-01 RX ADMIN — PHENYTOIN SODIUM 100 MG: 100 CAPSULE ORAL at 10:59

## 2017-01-01 RX ADMIN — HALOPERIDOL 0.5 MG: 0.5 TABLET ORAL at 16:02

## 2017-01-01 RX ADMIN — IRON DEXTRAN 25 MG: 50 INJECTION INTRAMUSCULAR; INTRAVENOUS at 17:34

## 2017-01-01 RX ADMIN — LISINOPRIL 40 MG: 20 TABLET ORAL at 09:05

## 2017-01-01 RX ADMIN — SERTRALINE 50 MG: 50 TABLET, FILM COATED ORAL at 10:59

## 2017-01-01 RX ADMIN — CYANOCOBALAMIN TAB 500 MCG 500 MCG: 500 TAB at 10:59

## 2017-01-01 RX ADMIN — Medication 325 MG: at 09:06

## 2017-01-01 RX ADMIN — SERTRALINE 50 MG: 50 TABLET, FILM COATED ORAL at 09:00

## 2017-01-01 RX ADMIN — FOLIC ACID 1 MG: 1 TABLET ORAL at 10:59

## 2017-01-01 RX ADMIN — SODIUM CHLORIDE: 9 INJECTION, SOLUTION INTRAVENOUS at 17:25

## 2017-01-01 RX ADMIN — DIVALPROEX SODIUM 125 MG: 125 CAPSULE, COATED PELLETS ORAL at 15:00

## 2017-01-01 RX ADMIN — TAZOBACTAM SODIUM AND PIPERACILLIN SODIUM 3.38 G: 375; 3 INJECTION, SOLUTION INTRAVENOUS at 23:16

## 2017-01-01 RX ADMIN — CYANOCOBALAMIN TAB 500 MCG 500 MCG: 500 TAB at 09:05

## 2017-01-01 RX ADMIN — DIVALPROEX SODIUM 125 MG: 125 CAPSULE, COATED PELLETS ORAL at 21:56

## 2017-01-01 RX ADMIN — SODIUM CHLORIDE, POTASSIUM CHLORIDE, SODIUM LACTATE AND CALCIUM CHLORIDE 1000 ML: 600; 310; 30; 20 INJECTION, SOLUTION INTRAVENOUS at 22:53

## 2017-01-01 RX ADMIN — ACETAMINOPHEN 650 MG: 325 TABLET, FILM COATED ORAL at 17:18

## 2017-01-01 RX ADMIN — INSULIN LISPRO 1 UNITS: 100 INJECTION, SOLUTION INTRAVENOUS; SUBCUTANEOUS at 12:21

## 2017-01-01 RX ADMIN — LISINOPRIL 40 MG: 20 TABLET ORAL at 10:40

## 2017-01-01 RX ADMIN — PANTOPRAZOLE SODIUM 40 MG: 40 INJECTION, POWDER, FOR SOLUTION INTRAVENOUS at 21:56

## 2017-01-01 RX ADMIN — AMLODIPINE BESYLATE 5 MG: 5 TABLET ORAL at 10:59

## 2017-01-01 RX ADMIN — PHENYTOIN SODIUM 100 MG: 100 CAPSULE ORAL at 09:08

## 2017-01-01 RX ADMIN — DIPHENHYDRAMINE HCL 25 MG: 25 TABLET ORAL at 17:19

## 2017-01-01 RX ADMIN — SODIUM CHLORIDE: 9 INJECTION, SOLUTION INTRAVENOUS at 05:37

## 2017-01-01 RX ADMIN — DIVALPROEX SODIUM 125 MG: 125 CAPSULE, COATED PELLETS ORAL at 09:08

## 2017-01-01 RX ADMIN — LORAZEPAM 1 MG: 2 INJECTION INTRAMUSCULAR; INTRAVENOUS at 23:50

## 2017-01-01 RX ADMIN — PANTOPRAZOLE SODIUM 40 MG: 40 INJECTION, POWDER, FOR SOLUTION INTRAVENOUS at 20:20

## 2017-01-01 RX ADMIN — DIVALPROEX SODIUM 125 MG: 125 CAPSULE, COATED PELLETS ORAL at 10:39

## 2017-01-01 RX ADMIN — SODIUM CHLORIDE: 9 INJECTION, SOLUTION INTRAVENOUS at 06:51

## 2017-01-01 RX ADMIN — PHENYTOIN SODIUM 100 MG: 100 CAPSULE ORAL at 10:39

## 2017-01-01 RX ADMIN — MORPHINE SULFATE 4 MG: 4 INJECTION INTRAVENOUS at 23:47

## 2017-01-01 RX ADMIN — AMLODIPINE BESYLATE 5 MG: 5 TABLET ORAL at 09:06

## 2017-01-01 RX ADMIN — PANTOPRAZOLE SODIUM 40 MG: 40 INJECTION, POWDER, FOR SOLUTION INTRAVENOUS at 09:06

## 2017-01-01 RX ADMIN — LISINOPRIL 40 MG: 20 TABLET ORAL at 10:59

## 2017-01-01 ASSESSMENT — PAIN SCALES - GENERAL
PAINLEVEL_OUTOF10: 0

## 2017-01-01 ASSESSMENT — PAIN SCALES - WONG BAKER: WONGBAKER_NUMERICALRESPONSE: DOESN'T HURT AT ALL

## 2017-01-01 ASSESSMENT — LIFESTYLE VARIABLES
ALCOHOL_USE: NO
EVER_SMOKED: NEVER
EVER_SMOKED: NEVER
DO YOU DRINK ALCOHOL: NO
EVER_SMOKED: NEVER

## 2017-03-09 PROBLEM — K92.2 UPPER GI BLEEDING: Status: ACTIVE | Noted: 2017-01-01

## 2017-03-09 NOTE — IP AVS SNAPSHOT
" Home Care Instructions                                                                                                                  Name:Juancho Patel  Medical Record Number:[id number]  CSN: [id number]    YOB: 1940   Age: 76 y.o.  Sex: male  HT:1.676 m (5' 5.98\") WT: 55.7 kg (122 lb 12.7 oz)          Admit Date: 3/9/2017     Discharge Date:   Today's Date: 3/12/2017  Attending Doctor:  [facility] [employer] [facility]                  Allergies:  Review of patient's allergies indicates no known allergies.            Discharge Instructions       Discharge Instructions    Discharged to [facility] by [facility] with [employer]. Discharged via [facility] transportation, hospital escort: [employer] escort service.  Special equipment needed: None     Be sure to schedule a follow-up appointment with your primary care doctor or any specialists as instructed.     Discharge Plan:   Diet Plan: Discussed, Diabetic  Activity Level: Discussed  Confirmed Follow up Appointment: No (Comments), No Follow Up Indicated per MD   Confirmed Symptoms Management: Discussed  Medication Reconciliation Updated: Yes  Influenza Vaccine Indication: Patient Refuses, Unable to give consent for vaccines, Dementia     I understand that a diet low in cholesterol, fat, and sodium is recommended for good health. Unless I have been given specific instructions below for another diet, I accept this instruction as my diet prescription.   Other diet: Diabetic diet, low in concentrated carbohydrates and sugars     Special Instructions: Drink plenty of fluids.     · Is patient discharged on Warfarin / Coumadin?   No    · Is patient Post Blood Transfusion?  Yes    Gastrointestinal Bleeding  Gastrointestinal (GI) bleeding means there is bleeding somewhere along the digestive tract, between the mouth and anus.  CAUSES   There are many different problems that can cause GI bleeding. Possible causes include:  · Esophagitis. This is inflammation, irritation, or swelling of " the esophagus.  · Hemorrhoids. These are veins that are full of blood (engorged) in the rectum. They cause pain, inflammation, and may bleed.  · Anal fissures. These are areas of painful tearing which may bleed. They are often caused by passing hard stool.  · Diverticulosis. These are pouches that form on the colon over time, with age, and may bleed significantly.  · Diverticulitis. This is inflammation in areas with diverticulosis. It can cause pain, fever, and bloody stools, although bleeding is rare.  · Polyps and cancer. Colon cancer often starts out as precancerous polyps.  · Gastritis and ulcers. Bleeding from the upper gastrointestinal tract (near the stomach) may travel through the intestines and produce black, sometimes tarry, often bad smelling stools. In certain cases, if the bleeding is fast enough, the stools may not be black, but red. This condition may be life-threatening.  SYMPTOMS   · Vomiting bright red blood or material that looks like coffee grounds.  · Bloody, black, or tarry stools.  DIAGNOSIS   Your caregiver may diagnose your condition by taking your history and performing a physical exam. More tests may be needed, including:  · X-rays and other imaging tests.  · Esophagogastroduodenoscopy (EGD). This test uses a flexible, lighted tube to look at your esophagus, stomach, and small intestine.  · Colonoscopy. This test uses a flexible, lighted tube to look at your colon.  TREATMENT   Treatment depends on the cause of your bleeding.   · For bleeding from the esophagus, stomach, small intestine, or colon, the caregiver doing your EGD or colonoscopy may be able to stop the bleeding as part of the procedure.  · Inflammation or infection of the colon can be treated with medicines.  · Many rectal problems can be treated with creams, suppositories, or warm baths.  · Surgery is sometimes needed.  · Blood transfusions are sometimes needed if you have lost a lot of blood.  If bleeding is slow, you may be  allowed to go home. If there is a lot of bleeding, you will need to stay in the hospital for observation.  HOME CARE INSTRUCTIONS   · Take any medicines exactly as prescribed.  · Keep your stools soft by eating foods that are high in fiber. These foods include whole grains, legumes, fruits, and vegetables. Prunes (1 to 3 a day) work well for many people.  · Drink enough fluids to keep your urine clear or pale yellow.  SEEK IMMEDIATE MEDICAL CARE IF:   · Your bleeding increases.  · You feel lightheaded, weak, or you faint.  · You have severe cramps in your back or abdomen.  · You pass large blood clots in your stool.  · Your problems are getting worse.  MAKE SURE YOU:   · Understand these instructions.  · Will watch your condition.  · Will get help right away if you are not doing well or get worse.     This information is not intended to replace advice given to you by your health care provider. Make sure you discuss any questions you have with your health care provider.     Document Released: 12/15/2001 Document Revised: 12/04/2013 Document Reviewed: 11/26/2012  [employer] Interactive Patient Education ©2016 [employer]    2000 Calorie Diabetic Diet  The 2000 calorie diabetic diet is designed for eating up to 2000 calories each day. Following this diet and making healthy meal choices can help improve overall health. It controls blood glucose (sugar) levels. It can also lower blood pressure and cholesterol.  SERVING SIZES  Measuring foods and serving sizes helps to make sure you are getting the right amount of food. The list below tells how big or small some common serving sizes are.  · 1 oz.........4 stacked dice.   · 3 oz.........Deck of cards.   · 1 tsp........Tip of little finger.   · 1 tbs........Thumb.   · 2 tbs........Golf ball.   · ½ cup.......Half of a fist.   · 1 cup........A fist.   GUIDELINES FOR CHOOSING FOODS  The goal of this diet is to eat a variety of foods and limit calories to 2000 each day. This  can be done by choosing foods that are low in calories and fat. The diet also suggests eating small amounts of food often. Doing this helps control your blood glucose levels so they do not get too high or too low. Each meal or snack should contain a protein food source to help you feel more satisfied and to stabilize your blood glucose. Try to eat about the same amount of food around the same time each day. This includes weekend days, travel days, and days off work. Space your meals about 4 to 5 hours apart and add a snack between them if you wish.  For example, a daily food plan could include breakfast, a morning snack, lunch, dinner, and an evening snack. Healthy meals and snacks include whole grains, vegetables, fruits, lean meats, poultry, fish, and dairy products. As you plan your meals, choose a variety of foods. Choose from the bread and starches, vegetables, fruit, dairy, and meat/protein groups. Examples of foods from each group are listed below with their suggested serving sizes. Use measuring cups and spoons to become familiar with what a healthy portion looks like.  Bread and Starches  Each serving equals 15 grams of carbohydrates.  · 1 slice bread.   · ¼ bagel.   · ¾ cup or 1 cup cold cereal (unsweetened).   · ½ cup hot cereal or mashed potatoes.   · 1 small potato (size of a computer mouse).   ·  cup cooked pasta or rice.   · ½ English muffin.   · 1 cup broth-based soup.   · 3 cups popcorn.   · 4 to 6 whole-wheat crackers.   · ½ cup cooked beans, peas, or corn.   Vegetables  Each serving equals 5 grams of carbohydrates.  · ½ cup cooked vegetables.   · 1 cup raw vegetables.   · ½ cup tomato juice.   Fruit  Each serving equals 15 grams of carbohydrates.  · 1 small apple, banana, or orange.   · 1 ¼ cup watermelon or strawberries.   · ½ cup applesauce (no sugar added).   · 2 tbs raisins.   · ½ banana.   · ½ cup unsweetened canned fruit.   · ½ cup unsweetened fruit juice.   Dairy  Each serving equals 12 to  15 grams of carbohydrates.  · 1 cup fat-free milk.   · 6 oz artificially sweetened yogurt.   · 1 cup buttermilk.   · 1 cup soy milk.   Meat/Protein  · 1 large egg.   · 2 to 3 oz meat, poultry, or fish.   · ½ cup cottage cheese.   · 1 tbs peanut butter.   · ½ cup tofu.   · 1 oz cheese.   · ¼ cup tuna canned in water.   SAMPLE 2000 CALORIE DIET PLAN  Breakfast  · 1 English muffin (2 carb servings).   · Reduced fat cream cheese, 1 tbs.   · 1 scrambled egg.   · ½ grapefruit (1 carb serving).   · Fat-free milk, 1 cup (1 carb serving).   Morning Snack  · Artificially sweetened yogurt, 6 oz (1 carb serving).   · 2 tbs chopped nuts.   · 1 small peach (1 carb serving).   Lunch  · Grilled chicken sandwich.   · 1 hamburger bun (2 carb servings).   · 2 oz chicken breast.   · 1 lettuce leaf.   · 2 slices tomato.   · Reduced fat mayonnaise, 1 tbs.   · Carrot sticks, 1 cup.   · Celery, 1 cup.   · 1 small apple (1 carb serving).   · Fat-free milk, 1 cup (1 carb serving).   Afternoon Snack  · ½ cup low-fat cottage cheese.   · 1 ¼ cups strawberries (1 carb serving).   Dinner  · Steak fajitas.   · 2 oz lean steak.   · 1 whole-wheat tortilla, 8 inches (1 ½ carb servings).   · Shredded lettuce, ¼ cup.   · 2 slices tomato.   · Salsa, ¼ cup.   · Low-fat sour cream, 2 tbs.   · Brown rice,  cup (1 carb serving).   · 1 small orange (1 carb serving).   Evening Snack  · 4 reduced fat whole-wheat crackers (1 carb serving).   · 1 tbs peanut butter.   · 12 to 15 grapes (1 carb serving).   MEAL PLAN  Use this worksheet to help you make a daily meal plan based on the 2000 calorie diabetic diet suggestions. The total amount of carbohydrates in your meal or snack is more important than making sure you include all of the food groups at every meal or snack. If you are using this plan to help you control your blood glucose, you may interchange carbohydrate containing foods (dairy, starches, and fruits). Choose a variety of fresh foods of varying colors  and flavors. You can choose from the following foods to build your day's meals:  · 11 Starches.   · 4 Vegetables.   · 3 Fruits.   · 3 Dairy.   · 8 oz Meat.   · Up to 6 Fats.   Your dietician can use this worksheet to help you decide how many servings and what types of foods are right for you.  BREAKFAST  Food Group and Servings / Food Choice  Starches ___________________________________________  Dairy ______________________________________________  Fruit ______________________________________________  Meat ______________________________________________  Fat________________________________________________  LUNCH  Food Group and Servings / Food Choice  Starch _____________________________________________  Meat ______________________________________________  Vegetables _________________________________________  Fruit ______________________________________________  Dairy______________________________________________  Fat________________________________________________  AFTERNOON SNACK  Food Group and Servings / Food Choice  Starch________________________________________________  Meat_________________________________________________  Fruit__________________________________________________  DINNER  Food Group and Servings / Food Choice  Starches ____________________________________________  Meat _______________________________________________  Dairy _______________________________________________  Vegetables __________________________________________  Fruit ________________________________________________  Fat_________________________________________________  EVENING SNACK  Food Group and Servings / Food Choice  Fruit _______________________________________________  Meat _______________________________________________  Starch ______________________________________________  DAILY TOTALS  Starches ________________________  Vegetables ______________________  Fruit ___________________________  Dairy  ___________________________  Meat ___________________________  Fat _____________________________  Document Released: 07/10/2006 Document Revised: 03/11/2013 Document Reviewed: 07/26/2010  ExitCare® Patient Information ©2013 [facility].    Depression / Suicide Risk    As you are discharged from this [facility] facility, it is important to learn how to keep safe from harming yourself.    Recognize the warning signs:  · Abrupt changes in personality, positive or negative- including increase in energy   · Giving away possessions  · Change in eating patterns- significant weight changes-  positive or negative  · Change in sleeping patterns- unable to sleep or sleeping all the time   · Unwillingness or inability to communicate  · Depression  · Unusual sadness, discouragement and loneliness  · Talk of wanting to die  · Neglect of personal appearance   · Rebelliousness- reckless behavior  · Withdrawal from people/activities they love  · Confusion- inability to concentrate     If you or a loved one observes any of these behaviors or has concerns about self-harm, here's what you can do:  · Talk about it- your feelings and reasons for harming yourself  · Remove any means that you might use to hurt yourself (examples: pills, rope, extension cords, firearm)  · Get professional help from the community (Mental Health, Substance Abuse, psychological counseling)  · Do not be alone:Call your Safe Contact- someone whom you trust who will be there for you.  · Call your local CRISIS HOTLINE 559-5256 or 293-075-0003  · Call your local Children's Mobile Crisis Response Team [place] (995) 282-2740 or www.Kovio  · Call the toll free National Suicide Prevention Hotlines   · National Suicide Prevention Lifeline 703-739-KYEV (8968)  · National Hope Line Network 800-SUICIDE (725-8794)           Discharge Medication Instructions:    Below are the medications your physician expects you to take upon discharge:    Review all  your home medications and newly ordered medications with your doctor and/or pharmacist. Follow medication instructions as directed by your doctor and/or pharmacist.    Please keep your medication list with you and share with your physician.               Medication List      CONTINUE taking these medications        Instructions    amlodipine 5 MG Tabs   Last time this was given:  5 mg on 3/12/2017 10:59 AM   Commonly known as:  NORVASC   Next Dose Due:  Tomorrow 3/13      Take 5 mg by mouth every day.   Dose:  5 mg       cyanocobalamin 500 MCG Tabs   Last time this was given:  500 mcg on 3/12/2017 10:59 AM   Commonly known as:  VITAMIN B-12   Next Dose Due:  Tomorrow 3/13      Take 500 mcg by mouth every day.   Dose:  500 mcg       Divalproex Sodium 125 MG Csdr   Last time this was given:  125 mg on 3/12/2017 10:59 AM   Commonly known as:  DEPAKOTE   Next Dose Due:  Take at 1600 today then at HS     Take 125 mg by mouth 3 times a day.   Dose:  125 mg       docusate sodium 100 MG Caps   Commonly known as:  COLACE   Next Dose Due:  Take tonight 3/12    Take 100 mg by mouth every evening.   Dose:  100 mg       folic acid 1 MG Tabs   Last time this was given:  1 mg on 3/12/2017 10:59 AM   Commonly known as:  FOLVITE   Next Dose Due:  Take tomorrow 3/13      Take 1 mg by mouth every day.   Dose:  1 mg       IRON SUPPLEMENT 325 (65 FE) MG tablet   Last time this was given:  325 mg on 3/10/2017  9:06 AM   Generic drug:  ferrous sulfate   Next Dose Due:  Take today 3/12    Take 325 mg by mouth every day.   Dose:  325 mg       lisinopril 40 MG tablet   Last time this was given:  40 mg on 3/12/2017 10:59 AM   Commonly known as:  PRINIVIL, ZESTRIL   Next Dose Due:  Take tomorrow 3/13    Take 40 mg by mouth every day.   Dose:  40 mg       magnesium oxide 400 MG Tabs   Commonly known as:  MAG-OX   Next Dose Due:  Take tonight 3/12    Take 400 mg by mouth 2 times a day.   Dose:  400 mg       metformin 1000 MG tablet   Commonly  known as:  GLUCOPHAGE   Next Dose Due:  Take at dinner tonight 3/12     Take 1,000 mg by mouth 2 times a day, with meals.   Dose:  1000 mg       omeprazole 20 MG delayed-release capsule   Commonly known as:  PRILOSEC   Next Dose Due:  Take tomorrow 3/13    Take 20 mg by mouth every day.   Dose:  20 mg       phenytoin  MG Caps   Last time this was given:  100 mg on 3/12/2017 10:59 AM   Commonly known as:  DILANTIN   Next Dose Due:  Take tomorrow 3/13     Take 100 mg by mouth every day.   Dose:  100 mg       senna-docusate 8.6-50 MG Tabs   Commonly known as:  PERICOLACE or SENOKOT S   Next Dose Due:  Take tonight 3/12     Take 1 Tab by mouth every bedtime.   Dose:  1 Tab       sertraline 50 MG Tabs   Last time this was given:  50 mg on 3/12/2017 10:59 AM   Commonly known as:  ZOLOFT   Next Dose Due:  Take tomorrow 3/13     Take 50 mg by mouth every day.   Dose:  50 mg       trazodone 100 MG Tabs   Last time this was given:  100 mg on 3/11/2017  9:56 PM   Commonly known as:  DESYREL   Next Dose Due:  Take tonight 3/12     Take 100 mg by mouth every evening.   Dose:  100 mg               Instructions           Diet / Nutrition:    Follow any diet instructions given to you by your doctor or the dietician, including how much salt (sodium) you are allowed each day.    If you are overweight, talk to your doctor about a weight reduction plan.    Activity:    Remain physically active following your doctor's instructions about exercise and activity.    Rest often.     Any time you become even a little tired or short of breath, SIT DOWN and rest.    Worsening Symptoms:    Report any of the following signs and symptoms to the doctor's office immediately:    *Pain of jaw, arm, or neck  *Chest pain not relieved by medication                               *Dizziness or loss of consciousness  *Difficulty breathing even when at rest   *More tired than usual                                       *Bleeding drainage or swelling of  surgical site  *Swelling of feet, ankles, legs or stomach                 *Fever (>100ºF)  *Pink or blood tinged sputum  *Weight gain (3lbs/day or 5lbs /week)           *Shock from internal defibrillator (if applicable)  *Palpitations or irregular heartbeats                *Cool and/or numb extremities    Stroke Awareness    Common Risk Factors for Stroke include:    Age  Atrial Fibrillation  Carotid Artery Stenosis  Diabetes Mellitus  Excessive alcohol consumption  High blood pressure  Overweight   Physical inactivity  Smoking    Warning signs and symptoms of a stroke include:    *Sudden numbness or weakness of the face, arm or leg (especially on one side of the body).  *Sudden confusion, trouble speaking or understanding.  *Sudden trouble seeing in one or both eyes.  *Sudden trouble walking, dizziness, loss of balance or coordination.Sudden severe headache with no known cause.    It is very important to get treatment quickly when a stroke occurs. If you experience any of the above warning signs, call 911 immediately.                   Disclaimer         Quit Smoking / Tobacco Use:    I understand the use of any tobacco products increases my chance of suffering from future heart disease or stroke and could cause other illnesses which may shorten my life. Quitting the use of tobacco products is the single most important thing I can do to improve my health. For further information on smoking / tobacco cessation call a Toll Free Quit Line at 1-429.672.4820 (*[employer] [place]) or 1-646.796.2829 (American Lung Association) or you can access the web based program at www.lungusa.org.    [place] Tobacco Users Help Line:  (482) 687-7864       Toll Free: 1-916.766.4772  Quit Tobacco Program [facility] (615)648-8634    Crisis Hotline:    [place] Crisis Hotline:  4-897-LEPVHDB or 1-886.787.2669    [place] Crisis Hotline:    1-385.579.7158 or 858-713-6596    Discharge Survey:   Thank you for  choosing [facility]. We hope we did everything we could to make your hospital stay a pleasant one. You may be receiving a phone survey and we would appreciate your time and participation in answering the questions. Your input is very valuable to us in our efforts to improve our service to our patients and their families.        My signature on this form indicates that:    1. I have reviewed and understand the above information.  2. My questions regarding this information have been answered to my satisfaction.  3. I have formulated a plan with my discharge nurse to obtain my prescribed medications for home.                  Disclaimer         __________________________________                     __________       ________                       Patient Signature                                                 Date                    Time

## 2017-03-09 NOTE — IP AVS SNAPSHOT
3/12/2017          Juancho Patel   [address] [address]  [facility] [address]    Dear Juancho:    [facility] wants to ensure your discharge home is safe and you or your loved ones have had all your questions answered regarding your care after you leave the hospital.    You may receive a telephone call within two days of your discharge.  This call is to make certain you understand your discharge instructions as well as ensure we provided you with the best care possible during your stay with us.     The call will only last approximately 3-5 minutes and will be done by a nurse.    Once again, we want to ensure your discharge home is safe and that you have a clear understanding of any next steps in your care.  If you have any questions or concerns, please do not hesitate to contact us, we are here for you.  Thank you for choosing [facility] for your healthcare needs.    Sincerely,    Alfredo Hood    [facility]

## 2017-03-09 NOTE — IP AVS SNAPSHOT
" <p align=\"LEFT\"><IMG SRC=\"[email or web address]\" alt=\"Image\" WIDTH=\"50%\" HEIGHT=\"200\" BORDER=\"\"></p>                   Name:Juancho Patel  Medical Record Number:[id number]  CSN: [id number]    YOB: 1940   Age: 76 y.o.  Sex: male  HT:1.676 m (5' 5.98\") WT: 55.7 kg (122 lb 12.7 oz)          Admit Date: 3/9/2017     Discharge Date:   Today's Date: 3/12/2017  Attending Doctor:  [employer] [facility]                  Allergies:  Review of patient's allergies indicates no known allergies.             Medication List      Take these Medications        Instructions    amlodipine 5 MG Tabs   Commonly known as:  NORVASC    Take 5 mg by mouth every day.   Dose:  5 mg       cyanocobalamin 500 MCG Tabs   Commonly known as:  VITAMIN B-12    Take 500 mcg by mouth every day.   Dose:  500 mcg       Divalproex Sodium 125 MG Csdr   Commonly known as:  DEPAKOTE    Take 125 mg by mouth 3 times a day.   Dose:  125 mg       docusate sodium 100 MG Caps   Commonly known as:  COLACE    Take 100 mg by mouth every evening.   Dose:  100 mg       folic acid 1 MG Tabs   Commonly known as:  FOLVITE    Take 1 mg by mouth every day.   Dose:  1 mg       IRON SUPPLEMENT 325 (65 FE) MG tablet   Generic drug:  ferrous sulfate    Take 325 mg by mouth every day.   Dose:  325 mg       lisinopril 40 MG tablet   Commonly known as:  PRINIVIL, ZESTRIL    Take 40 mg by mouth every day.   Dose:  40 mg       magnesium oxide 400 MG Tabs   Commonly known as:  MAG-OX    Take 400 mg by mouth 2 times a day.   Dose:  400 mg       metformin 1000 MG tablet   Commonly known as:  GLUCOPHAGE    Take 1,000 mg by mouth 2 times a day, with meals.   Dose:  1000 mg       omeprazole 20 MG delayed-release capsule   Commonly known as:  PRILOSEC    Take 20 mg by mouth every day.   Dose:  20 mg       phenytoin  MG Caps   Commonly known as:  DILANTIN    Take 100 mg by mouth every day.   Dose:  100 mg       senna-docusate 8.6-50 MG Tabs   Commonly known as: "  PERICOLACE or SENOKOT S    Take 1 Tab by mouth every bedtime.   Dose:  1 Tab       sertraline 50 MG Tabs   Commonly known as:  ZOLOFT    Take 50 mg by mouth every day.   Dose:  50 mg       trazodone 100 MG Tabs   Commonly known as:  DESYREL    Take 100 mg by mouth every evening.   Dose:  100 mg

## 2017-03-09 NOTE — IP AVS SNAPSHOT
Nimbic (formerly Physware) Access Code: [email or web address]  Expires: 4/11/2017  2:03 PM    Your email address is not on file at [facility].  Email Addresses are required for you to sign up for Nimbic (formerly Physware), please contact [phone] to verify your personal information and to provide your email address prior to attempting to register for Nimbic (formerly Physware).    Juancho Patel  [address] [address]  [place] [address]    Nimbic (formerly Physware)  A secure, online tool to manage your health information     [facility]’s Nimbic (formerly Physware)® is a secure, online tool that connects you to your personalized health information from the privacy of your home -- day or night - making it very easy for you to manage your healthcare. Once the activation process is completed, you can even access your medical information using the Nimbic (formerly Physware) neal, which is available for free in the Apple Neal store or Google Play store.     To learn more about Nimbic (formerly Physware), visit [email or web address]    There are two levels of access available (as shown below):   My Chart Features  [facility] Primary Care Doctor [facility]  Specialists [facility]  Urgent  Care Non-[facility] Primary Care Doctor   Email your healthcare team securely and privately 24/7 X X X    Manage appointments: schedule your next appointment; view details of past/upcoming appointments X      Request prescription refills. X      View recent personal medical records, including lab and immunizations X X X X   View health record, including health history, allergies, medications X X X X   Read reports about your outpatient visits, procedures, consult and ER notes X X X X   See your discharge summary, which is a recap of your hospital and/or ER visit that includes your diagnosis, lab results, and care plan X X  X     How to register for [employer]:  Once your e-mail address has been verified, follow the following steps to sign up for [employer].     1. Go to  [email or web address]  2. Click on the Sign Up Now box, which takes you to the New Member Sign Up page. You will  need to provide the following information:  a. Enter your Jule Game Access Code exactly as it appears at the top of this page. (You will not need to use this code after you’ve completed the sign-up process. If you do not sign up before the expiration date, you must request a new code.)   b. Enter your date of birth.   c. Enter your home email address.   d. Click Submit, and follow the next screen’s instructions.  3. Create a Cinedigmt ID. This will be your Jule Game login ID and cannot be changed, so think of one that is secure and easy to remember.  4. Create a Jule Game password. You can change your password at any time.  5. Enter your Password Reset Question and Answer. This can be used at a later time if you forget your password.   6. Enter your e-mail address. This allows you to receive e-mail notifications when new information is available in Jule Game.  7. Click Sign Up. You can now view your health information.    For assistance activating your Jule Game account, call [phone]

## 2017-03-10 PROBLEM — G30.9 ALZHEIMER'S DEMENTIA WITH BEHAVIORAL DISTURBANCE (HCC): Status: ACTIVE | Noted: 2017-01-01

## 2017-03-10 PROBLEM — I10 ESSENTIAL HYPERTENSION: Status: ACTIVE | Noted: 2017-01-01

## 2017-03-10 PROBLEM — J44.9 CHRONIC OBSTRUCTIVE PULMONARY DISEASE (HCC): Status: ACTIVE | Noted: 2017-01-01

## 2017-03-10 PROBLEM — E11.9 CONTROLLED TYPE 2 DIABETES MELLITUS WITHOUT COMPLICATION (HCC): Status: ACTIVE | Noted: 2017-01-01

## 2017-03-10 PROBLEM — J41.0 SIMPLE CHRONIC BRONCHITIS (HCC): Status: ACTIVE | Noted: 2017-01-01

## 2017-03-10 PROBLEM — F02.818 ALZHEIMER'S DEMENTIA WITH BEHAVIORAL DISTURBANCE (HCC): Status: ACTIVE | Noted: 2017-01-01

## 2017-03-10 NOTE — ASSESSMENT & PLAN NOTE
He was admitted to inpatient psych facility in 11/2016 for behavioral disturbance, inappropriate sexual behaviors.  Continue divalproex, phenytoin  Haldol PRN for agitation

## 2017-03-10 NOTE — RESPIRATORY CARE
COPD EDUCATION by COPD CLINICAL EDUCATOR  3/10/2017 at 7:41 AM by Katie Wheeler     Patient reviewed by COPD education team. Patient does not qualify for COPD program.

## 2017-03-10 NOTE — SENIOR ADMIT NOTE
"Pt is demented and hard hearing  Juancho Patel is a 76 y.o. Male with PMH of gastritis  dementia, CAD, HTN, DM, COPD, DLD, depression and closed head injury. Who was  transfered from [facility] for a hemogolbin of 6.4. In the ED his HG was 7.4, Cr 0.69, BUN 23, AST 19, ALT 54. CXR was unremarkable, bedside rectal exam showed no gross blood, but FIT card strongly positive.. GI [facility] was called. He has EGD done in 8/2016 and showed esophagitis, no colonscopy was performed. He was transfused blood in the ED.    EGD on 7/31/2016  POSTOPERATIVE FINDINGS:  1.  A 6 cm grade III/IV esophagitis with ulcerations involving 30-36 cm of the   distal esophagus.  2.  Gastroesophageal junction appreciated at 36 cm.  3.  Hiatal hernia at 3 cm.  4.  Duodenitis in the bulb.  5.  Otherwise, normal gastroscopy to second portion of duodenum.      /52 mmHg  Pulse 76  Temp(Src) 36.6 °C (97.9 °F)  Resp 14  Ht 1.676 m (5' 5.98\")  Wt 58.968 kg (130 lb)  BMI 20.99 kg/m2  SpO2 97%    Pt stable, looks well, pale not jaundice, with bilateral parotid swelling  Chest is clear, normal S1,S2, no GMR  ABD is soft, not tender  No edema, intact peripheral pulses      UGI bleed likely esophagitis or gastritis  Microcytic hypochromic anemia  Dementia  CAD  HTN  DM  COPD  DLD  depression    Plan  Admit to tele/inpt  NPO at mid night  IVF  Pantoprazole 40 IV bId  Hold ASA  Trend HG q12  Cont Iron supp, folic acid and B12  halodol for agitation prn, cont Divalproex   RT, albuterol, atrovent prn  Hold metformin  ISS, fingerstick and hypoglycemic protocol  Check PO4 for refeeding syndrome  Cont other home meds for HTN, CAD, depression and constipation  Follow GI recs         "

## 2017-03-10 NOTE — PROGRESS NOTES
IV Iron Per Pharmacy Note    Patient Lean Body Weight:  50 kg  Reason for Iron Replacement: (Iron Deficiency Anemia, Acute blood loss, Epogen Use, GIB, etc.) GIB    Lab Results   Component Value Date/Time    WBC 5.6 03/10/2017 07:53 AM    RBC 3.04* 03/10/2017 07:53 AM    HEMOGLOBIN 6.2* 03/10/2017 07:53 AM    HEMATOCRIT 22.4* 03/10/2017 07:53 AM    MCV 73.7* 03/10/2017 07:53 AM    MCH 20.4* 03/10/2017 07:53 AM    MCHC 27.7* 03/10/2017 07:53 AM    MPV 9.8 03/10/2017 07:53 AM       Recent Labs      03/10/17   0559  03/10/17   0753   FERRITIN   --   6.6*   IRON  <10*   --       Recent Labs      03/10/17   0559   CREATININE  0.68     Assessment/Plan:  1. IV Iron Indicated.   2. Give Iron Dextran 25 mg IV test dose following diphenhydramine/acetaminophen premeds over 30 minutes per protocol.  3. If no reaction (Anaphylaxis, Hypotension/Hypertension, N/V/D, Chest pain/Back Pain, Urticaria/Pruritis) in the next hour, proceed to full dose. Nursing to call the pharmacy IV room at ext. [phone] for full dose.  4. Full dose: Iron Dextran 1575 mg IV over 4 hours. Continue to monitor for delayed ADR including: Arthralgia/myalgia, Headache/backache, chills/dizziness/malaise, moderate to high fever and n/v.      Shanthi Alexander, PharmD

## 2017-03-10 NOTE — PROGRESS NOTES
Pt admitted from ED via gurney.  Assumed pt care, assessment complete.  Oriented to room/controls, needs met at this time, bed alarm armed, belongings and call light in reach treaded socks on, bed in low position.  Follows simple commands, w/ more than one staff at bedside, pt becomes anxious and hollers answers.  Can name place, and situation.  Positioned w/ seizure/fall precautions intact, npo maintained.

## 2017-03-10 NOTE — CARE PLAN
Problem: Safety  Goal: Will remain free from falls  Outcome: PROGRESSING AS EXPECTED  Seizure precautions intact, bed alarms armed, call bell in reach, reoriented prn        Problem: Knowledge Deficit  Goal: Knowledge of disease process/condition, treatment plan, diagnostic tests, and medications will improve  Outcome: PROGRESSING SLOWER THAN EXPECTED  Pt is confused but pleasant, minimal retention of info noted, reoriented w/ ea encounter

## 2017-03-10 NOTE — CARE PLAN
Problem: Safety  Goal: Will remain free from falls  Outcome: PROGRESSING SLOWER THAN EXPECTED

## 2017-03-10 NOTE — ASSESSMENT & PLAN NOTE
Hx of esophagitis with ulcerations of distal esophagus  Transferred from [facility] for low HB 6.4, in the ED Hb 7.4  NEVIN neg, FIT positive,  no melena    Hemodynamically stable  ED exam: palor, no abdominal masses, no tenderness   HB 7.4>>6.2>>7.6 >> now 8.4    Plan:  Iron PO  Omeprazole home dose

## 2017-03-10 NOTE — DISCHARGE PLANNING
Care Transition Team Assessment    Spoke to the patient's daughter and POGilmer Stokes and she lives in [place], [place]  And would like someone from the clinical team to call to see how her father is doing. Her number is [phone].    Information Source  Orientation : Unable to Assess  Information Given By: Other (Comments)  Informant's Name: Gilmer Buckner- [phone]  Who is responsible for making decisions for patient? : POA  Name(s) of Primary Decision Maker: Gilmer Buckner    Readmission Evaluation  Is this a readmission?: No    Elopement Risk  Legal Hold: No  Ambulatory or Self Mobile in Wheelchair: No-Not an Elopement Risk    Interdisciplinary Discharge Planning  Does Admitting Nurse Feel This Could be a Complex Discharge?: No  Name of Care Facility: [facility]    Discharge Preparedness  What is your plan after discharge?: Uncertain - pending medical team collaboration  What are your discharge supports?: retirement parent (Patient's daughter lives in [facility])    Vision / Hearing Impairment  Hearing Impairment : Yes  Hearing Impairment: Both Ears    Advance Directive  Advance Directive?: DPOA for Health Care  Durable Power of  Name and Contact : Gilmer Buckner- [phone]    Domestic Abuse  Have you ever been the victim of abuse or violence?: No    Psychological Assessment  History of Substance Abuse: None  History of Psychiatric Problems: Yes (Dementia)  Non-compliant with Treatment: Yes  Newly Diagnosed Illness: No    Anticipated Discharge Information  Anticipated discharge disposition: Discharge needs currently unknown  Discharge Address: [facility]  Discharge Contact Phone Number: [phone]

## 2017-03-10 NOTE — PROGRESS NOTES
12 hr chart check  Monitor summary:  .18/.10/.38 SR 75-78 rare pvcs  Pt has had 2 dark brown stools, pericare prn, slept well , ivf infusing, endocrinologist vs, plan for endoscopy today, remains NPO since admit.

## 2017-03-10 NOTE — H&P
"       [facility] Admitting History and Physical    Name Juancho Patel       1940   Age/Sex 76 y.o. male   MRN [id number]   Code Status Full code     After 5PM or if no immediate response to page, please call for cross-coverage  Attending/Team: /Priyank Call [phone] to page   1st Call - Day Intern (R1):   Dr. Mead 2nd Call - Day Sr. Resident (R2/R3):   Dr. Powell       Chief Complaint:  Transferred from [facility] due to low hemoglobin  HPI:  Mr Patel is a 76 year old male with a PMH significant esophagitis, duodenitis, and severe dementia, closed head injury, seizures, depression DM, HTN, DLD  transferred from [facility] to [facility] for a hemoglobin of 6.4.  The patient was demented, A0x0, verbally abusive, and not co-operative during the encounter. However he denied abdominal pain, he did not answer any other questions and yelled \"get out of the room\"   Per chart review and per ED note,  bedside rectal exam showed no gross blood and FIT test positive     He was previously admitted in 2016 through 2016 for an upper GI bleed presented as black tarry stools and a hemoglobin of 6.9. He was seen by Dr. Smith Alvarado performed in the esophagogastroduodenoscopy which showed a 6 cm grade III to IV esophagitis with ulcerations in the distal esophagus, as well as duodenits in the bulb. He was transfused with 2 units of blood and remained stable he was then transferred back to skilled nursing facility.    In the ED HR 76/min /52 mmhg Temp(Src) 36.6 °C (97.9 °F)  Resp 14  Ht 1.676 m (5' 5.98\")  Wt 58.968 kg (130 lb)  BMI 20.99 kg/m2  SpO2 97%  Received 1 U PRBC at the ED  GI consulted by ERP.      ROS  Unable to obtain due to patient condition, dementia        Past Medical History:       Past Medical History   Diagnosis Date   • CAD (coronary artery disease)    • HTN (hypertension)    • DM (diabetes mellitus) (CMS-[facility])    • COPD (chronic obstructive " "pulmonary disease) (CMS-[facility])    • CHI (closed head injury)      rt frontal bleed in 2008   • Nasal fracture    • Dyslipidemia    • Depression        Past Surgical History:  Past Surgical History   Procedure Laterality Date   • Gastroscopy-endo  7/30/2016     Procedure: GASTROSCOPY-ENDO;  Surgeon: Smith Alvarado M.D.;  Location: ENDOSCOPY [facility];  Service:        Current Outpatient Medications:  Home Medications     Reviewed by Hossein Aguilar D.O. (Resident) on 03/09/17 at 1912  Med List Status: Complete    Medication Last Dose Status    NS infusion  Active                Medication Allergy/Sensitivities:  No Known Allergies      Family History:  History reviewed. No pertinent family history.    Social History:  Social History     Social History   • Marital Status: Single     Spouse Name: N/A   • Number of Children: N/A   • Years of Education: N/A     Occupational History   • Not on file.     Social History Main Topics   • Smoking status: Smoker, Current Status Unknown   • Smokeless tobacco: Not on file   • Alcohol Use: No   • Drug Use: No   • Sexual Activity: Not on file     Other Topics Concern   • Not on file     Social History Narrative     Living situation: at [facility]  PCP : Travis Delcid M.D.        Physical Exam     Filed Vitals:    03/09/17 1730 03/09/17 1800 03/09/17 1830 03/09/17 1900   BP:       Pulse: 74 76 85 76   Temp:       Resp:   18 14   Height:       Weight:       SpO2: 94% 96% 96% 97%     Body mass index is 20.99 kg/(m^2).  /52 mmHg  Pulse 76  Temp(Src) 36.6 °C (97.9 °F)  Resp 14  Ht 1.676 m (5' 5.98\")  Wt 58.968 kg (130 lb)  BMI 20.99 kg/m2  SpO2 97%  O2 therapy: Pulse Oximetry: 97 %, O2 Delivery: None (Room Air)    Physical Exam   Constitutional:   Thin built elderly male   HENT:   Head: Normocephalic and atraumatic.   Eyes: Pupils are equal, round, and reactive to light.   Conjunctival palor noted, no icterus   Neck: Normal range of motion.   Cardiovascular: " Normal rate, regular rhythm and normal heart sounds.    Pulmonary/Chest: Effort normal and breath sounds normal. No respiratory distress. He has no wheezes.   Abdominal: Soft. Bowel sounds are normal. He exhibits no distension. There is no tenderness.   Genitourinary:   Urinary incontinence   Musculoskeletal: He exhibits no edema.   Neurological: He is alert.   Oriented to self only.   Not co-operative with further assessment   Skin: Skin is warm.             Data Review       Old Records Request:   Completed  Current Records review and summary: Completed    Lab Data Review:  Recent Results (from the past 24 hour(s))   COD (Adult) - Type and Crossmatch only order if transfusing RBC'S    Collection Time: 03/09/17  5:50 PM   Result Value Ref Range    ABO Grouping Only A     Rh Grouping Only POS     Antibody Screen-Cod NEG    CBC WITH DIFFERENTIAL    Collection Time: 03/09/17  5:50 PM   Result Value Ref Range    WBC 6.8 4.8 - 10.8 K/uL    RBC 3.61 (L) 4.70 - 6.10 M/uL    Hemoglobin 7.4 (L) 14.0 - 18.0 g/dL    Hematocrit 26.8 (L) 42.0 - 52.0 %    MCV 74.2 (L) 81.4 - 97.8 fL    MCH 20.5 (L) 27.0 - 33.0 pg    MCHC 27.6 (L) 33.7 - 35.3 g/dL    RDW 47.3 35.9 - 50.0 fL    Platelet Count 328 164 - 446 K/uL    MPV 9.7 9.0 - 12.9 fL    Nucleated RBC 0.00 /100 WBC    NRBC (Absolute) 0.00 K/uL    Neutrophils-Polys 75.70 (H) 44.00 - 72.00 %    Lymphocytes 19.10 (L) 22.00 - 41.00 %    Monocytes 3.50 0.00 - 13.40 %    Eosinophils 1.70 0.00 - 6.90 %    Basophils 0.00 0.00 - 1.80 %    Neutrophils (Absolute) 5.15 1.82 - 7.42 K/uL    Lymphs (Absolute) 1.30 1.00 - 4.80 K/uL    Monos (Absolute) 0.24 0.00 - 0.85 K/uL    Eos (Absolute) 0.12 0.00 - 0.51 K/uL    Baso (Absolute) 0.00 0.00 - 0.12 K/uL    Hypochromia 2+     Anisocytosis 1+     Microcytosis 1+    CMP    Collection Time: 03/09/17  5:50 PM   Result Value Ref Range    Sodium 138 135 - 145 mmol/L    Potassium 4.5 3.6 - 5.5 mmol/L    Chloride 104 96 - 112 mmol/L    Co2 24 20 - 33  mmol/L    Anion Gap 10.0 0.0 - 11.9    Glucose 142 (H) 65 - 99 mg/dL    Bun 23 (H) 8 - 22 mg/dL    Creatinine 0.69 0.50 - 1.40 mg/dL    Calcium 9.5 8.5 - 10.5 mg/dL    AST(SGOT) 19 12 - 45 U/L    ALT(SGPT) 54 (H) 2 - 50 U/L    Alkaline Phosphatase 76 30 - 99 U/L    Total Bilirubin 0.2 0.1 - 1.5 mg/dL    Albumin 4.1 3.2 - 4.9 g/dL    Total Protein 8.2 6.0 - 8.2 g/dL    Globulin 4.1 (H) 1.9 - 3.5 g/dL    A-G Ratio 1.0 g/dL   ESTIMATED GFR    Collection Time: 03/09/17  5:50 PM   Result Value Ref Range    GFR If African American >60 >60 mL/min/1.73 m 2    GFR If Non African American >60 >60 mL/min/1.73 m 2   DIFFERENTIAL MANUAL    Collection Time: 03/09/17  5:50 PM   Result Value Ref Range    Manual Diff Status PERFORMED    PERIPHERAL SMEAR REVIEW    Collection Time: 03/09/17  5:50 PM   Result Value Ref Range    Peripheral Smear Review see below    PLATELET ESTIMATE    Collection Time: 03/09/17  5:50 PM   Result Value Ref Range    Plt Estimation Normal    MORPHOLOGY    Collection Time: 03/09/17  5:50 PM   Result Value Ref Range    RBC Morphology Present     Large Platelets 1+     Poikilocytosis 1+     Ovalocytes 1+     Schistocytes 1+        Imaging/Procedures Review:    ndependant Imaging Review: Completed  DX-CHEST-LIMITED (1 VIEW)   Final Result      No evidence of acute cardiopulmonary process.               EKG:   EKG Independant Review: Completed  QTc:463, HR: 82, Normal Sinus Rhythm, no ST/T changes   Records reviewed and summarized in current documentation             Assessment/Plan         Upper GI bleeding   Hx of esophagitis with ulcerations of distal esophagus  No tarry stools, hematemesis noted   Transferred from [facility] for low HB 6.4, in the ED Hb 7.4  Rectal exam - no blood noted, FIT card positive   Hemodynamically stable  Pale on PE  No abdominal masses. tenderness     HB 7.4, hypochromic microcytic RDW 47.3 platelet 328     EGD 7/31/2016  1.  A 6 cm grade III/IV esophagitis with ulcerations  involving 30-36 cm of the   distal esophagus.  2.  Gastroesophageal junction appreciated at 36 cm.  3.  Hiatal hernia at 3 cm.  4.  Duodenitis in the bulb.  5.  Otherwise, normal gastroscopy to second portion of duodenum.    Dr. Carroll, GI consulted by ERP, pending recommendations    Plan:  Admitted to tele  IV  cc/hour  GI soft diabetic diet, NPO after midnight  Trend H & H Q8H  Held ASA  Continue folic acid, Iron  pantoprazole 40 mg BID IV      Other chronic medical problems  Diabetes mellitus  HbA1c in 2015 is 6.8  Will repeat HbA1c  Will hold metformin  Accu-Cheks before meals 3 times a day and daily at bedtime  Insulin sliding scale 3 times a day before meals  Hypoglycemia protocol     Hypertension  Continue amlodipine 5 mg once daily  Lisinopril 40 mg once daily    COPD  RT per protocol  Continuous pulse oximetry  Oxygen therapy per protocol  Duonebs 4 hours when necessary    Dementia/behavioural disorder  He was admitted to inpatient psych facility in 11/2016 for behavioral disturbance, inappropriate sexual behaviors.  Continue divalproex, phenytoin  Haldol when necessary for agitation         Anticipated Hospital stay:  >2 midnights      Quality Measures  EKG reviewed, Labs reviewed, Radiology images reviewed and Medications reviewed  Iverson catheter: No Iverson      DVT Prophylaxis: Contraindicated - High bleeding risk  DVT prophylaxis - mechanical: SCDs

## 2017-03-10 NOTE — PROGRESS NOTES
[facility] Interval [place]    Name Juancho Patel       1940   Age/Sex 76 y.o. male   MRN [id number]   Code Status full     After 5PM or if no immediate response to page, please call for cross-coverage  Attending/Team: Ti Call [phone] to page   1st Call - Day Intern (R1):   [place] 2nd Call - Day Sr. Resident (R2/R3):   ;[place]         Chief complaint/ reason for interval visit (Primary Diagnosis)   Anemia    Interval Problem Daily Status Update    Pending results from EGD  Called daughter, but she did not answer the phone    Active Hospital Problems    Diagnosis   • Upper GI bleeding [K92.2]   • Acute GI bleeding [K92.2]   • Anemia [D64.9]       Review of Systems   Unable to perform ROS: dementia       Consultants/Specialty  GI    Disposition  Inpatient being treated for GI bleed and iron deficiency anemia     Quality Measures  EKG reviewed, Labs reviewed, Medications reviewed and Radiology images reviewed  Iversno catheter: No Iverson        DVT prophylaxis - mechanical: SCDs  Ulcer prophylaxis: Yes (IV pantoprazole)              Physical Exam       Filed Vitals:    03/10/17 0400 03/10/17 0532 03/10/17 0830 03/10/17 1200   BP: 112/59  106/55 138/58   Pulse: 76 74 67 75   Temp: 36.8 °C (98.2 °F)  36.4 °C (97.6 °F) 36.2 °C (97.2 °F)   Resp: 18 16 18 18   Height:       Weight:       SpO2: 95% 94% 95% 95%     Body mass index is 18.58 kg/(m^2). Weight: 52.2 kg (115 lb 1.3 oz)  Oxygen Therapy:  Pulse Oximetry: 95 %, O2 (LPM): 0, O2 Delivery: None (Room Air)    Physical Exam  Gen: cachectic, ill tempered, yelling, not answering questions and not allowing a physical exam.     Lab Data Review:      3/10/2017  2:30 PM    Recent Labs      17   1750  03/10/17   0559   SODIUM  138  137   POTASSIUM  4.5  4.6   CHLORIDE  104  106   CO2  24  22   BUN  23*  21   CREATININE  0.69  0.68   PHOSPHORUS   --   2.7   CALCIUM  9.5  8.5       Recent Labs      17    1750  03/10/17   0559   ALTSGPT  54*  38   ASTSGOT  19  16   ALKPHOSPHAT  76  66   TBILIRUBIN  0.2  0.2   GLUCOSE  142*  109*       Recent Labs      03/09/17   1750  03/10/17   0559  03/10/17   0753   RBC  3.61*   --   3.04*   HEMOGLOBIN  7.4*   --   6.2*   HEMATOCRIT  26.8*   --   22.4*   PLATELETCT  328   --   280   IRON   --   <10*   --    FERRITIN   --    --   6.6*   TOTIRONBC   --   370   --        Recent Labs      03/09/17   1750  03/10/17   0559  03/10/17   0753   WBC  6.8   --   5.6   NEUTSPOLYS  75.70*   --   58.90   LYMPHOCYTES  19.10*   --   24.00   MONOCYTES  3.50   --   13.50*   EOSINOPHILS  1.70   --   2.70   BASOPHILS  0.00   --   0.70   ASTSGOT  19  16   --    ALTSGPT  54*  38   --    ALKPHOSPHAT  76  66   --    TBILIRUBIN  0.2  0.2   --            Assessment/Plan   Acute GI bleeding  Assessment & Plan  Hx of esophagitis with ulcerations of distal esophagus  Transferred from [facility] for low HB 6.4, in the ED Hb 7.4  NEVIN neg, FIT positive,  no melena    Hemodynamically stable  ED exam: palor, no abdominal masses, no tenderness   HB 7.4>>6.2>>7.6      Plan:  EGD today per GI  IV  cc/hour  Trend H & H Q8H  Iron IV  pantoprazole 40 mg BID IV          Iron deficiency anemia due to chronic blood loss  Assessment & Plan  Transferred from [facility] with HB 6.4  Likely slow upper GI bleed from esophageal ulcers  -Hb in ED 7.4 > 6.2 > 7.6  -per GI recs transfuse if below 6.5    -Iron <10, TIBC 370, ferritin 6.6      Plan:  -IV iron  -Monitor H&H Q8hrs  -pending CEA                   Controlled type 2 diabetes mellitus without complication (CMS-HCC)  Assessment & Plan  HbA1c in 2015 is 6.8, now 5.9  Hold metformin  On ISS and Hypoglycemia protocol     Essential hypertension  Assessment & Plan  Continue amlodipine 5 mg and Lisinopril 40 mg QD    Chronic obstructive pulmonary disease (CMS-[facility])  Assessment & Plan  RT per protocol  Continuous pulse oximetry  Oxygen therapy per protocol  Jemal 4 hours  PRN    Alzheimer's dementia with behavioral disturbance  Assessment & Plan  He was admitted to inpatient psych facility in 11/2016 for behavioral disturbance, inappropriate sexual behaviors.  Continue divalproex, phenytoin  Haldol PRN for agitation      No new assessment & plan notes have been filed under this hospital service since the last note was generated.  Service: MEDICAL

## 2017-03-10 NOTE — ED NOTES
Patient brought in by EMS for abnormal labs at [facility]. H&H at 6.4/22.2. Patient has chronic anemia. Patient A&O X1 to self. Patient has a history of dementia. No known active bleeding.

## 2017-03-10 NOTE — CONSULTS
DATE OF SERVICE:  03/10/2017    CHIEF COMPLAINT:  Anemia, question GI bleed.    HISTORY OF PRESENT ILLNESS:  We were asked by the primary care hospitalist   team as well as by the ER physician to consult on this patient who has anemia   and probably acute-on-chronic anemia, possibly more chronic than acute.  His   hemoglobin is now at 7.4, his MCV is 74.  The patient has dementia and is a   difficult historian.  Last night when he came to the floor, he was more   amenable to interview.  According to the RN, he was telling his own name, but   otherwise not too communicative.  This morning, apparently and overnight, he   has been confused and he has been mostly silent.  He has underlying probably   Alzheimer's dementia.  Electrolytes are grossly within normal limits.  There   is a minimal elevation of the ALT just above the upper limit of normal at 54.    Electrolytes are otherwise normal.  There is no evidence for leukocytosis.    The patient has not given any indication of abdominal pain or discomfort.    According to the admitting note, the patient had been earlier verbally abusive   and not cooperative during the encounter.  He appears exhausted.  His eyes   are open, but he is not answering any questions this morning.  The patient has   a history of GI bleeding, presenting with melena and a hemoglobin of 6.9 last   summer.  He at that time had severe ulcerative esophagitis.  He also had   duodenitis.  He was then transfused and transferred back to the skilled   nursing facility.  His hemoglobin chronically runs around 8 or 9.  The patient   has been hemodynamically stable.  He was actually transferred from his   nursing home for a low hemoglobin.    PAST MEDICAL HISTORY:  GI bleed, duodenitis, esophagitis, coronary artery   disease, hypertension, diabetes, COPD, closed head injury, dyslipidemia.    ALLERGIES:  NKDA.    SOCIAL HISTORY:  Unable to obtain.  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    FAMILY HISTORY:   Unable to obtain.    MEDICATIONS:  Currently, pantoprazole, aspirin, vitamin B12, Depakote, iron   sulfate, folic acid, magnesium oxide, metformin, omeprazole, fentanyl,   phenytoin, Danni-Colace, Zoloft.  Here in the hospital, he is getting   ondansetron, acetaminophen, amlodipine.    PHYSICAL EXAMINATION:  GENERAL:  Nontoxic, nondiaphoretic, pale, not communicative.  HEENT:  Open.  Sclerae are anicteric.  NECK:  Supple.  No tracheal deviation.  CHEST:  No respiratory distress, comfortably breathing.  No abnormal expansion   or asymmetrical expansion of the chest.  CARDIOVASCULAR:  RRR.  ABDOMEN:  Soft, flat, no rebound, no rigidity.  EXTREMITIES:  No CCE.  NEUROLOGIC AND PSYCHIATRIC:  He is moving all extremities, but is not   responding, otherwise.    IMPRESSION:  1.  Anemia, chronic.  2.  Iron deficiency.  3. Dementia.  4.  Coronary artery disease, hypertension, diabetes, chronic obstructive   pulmonary disease, but appears to be stable from that perspective.  He is   tolerating this significant anemia fairly well and other indication of chronic   anemia.    RECOMMENDATIONS:  1.  Strongly recommend intravenous iron.  This patient will likely not absorb   the iron orally.  He may not be compliant as well and it may take a long time   to substitute him orally.  I would recommend that he be substituted   intravenously.  2.  Transfuse if he drops below 6.5.  3.  He will be scheduled for endoscopy later today.  We may have to try to   reach the power of  to discuss this further.    It is of course also indicated to continue IV Protonix, hold the aspirin   currently and again, would strongly suggest intravenous iron rather than oral   iron, which may be difficult to absorb also in a patient who is on chronically   proton pump inhibitors.    We thank you for this consultation and will follow the patient with you.    Of note, the current vital include temperature 36.8, 76 pulse, 18   respirations, blood pressure  112/59.       ____________________________________     Alfredo Kay MD EMD / YANELIS    DD:  03/10/2017 06:26:23  DT:  03/10/2017 07:08:55    D#:  [id number]  Job#:  [id number]

## 2017-03-10 NOTE — ASSESSMENT & PLAN NOTE
Transferred from [facility] with HB 6.4  Likely slow upper GI bleed from esophageal ulcers  -Hb in ED 7.4 > 6.2 > 7.6  -CEA wnl  -Iron <10, TIBC 370, ferritin 6.6      Plan:  -Continue PO iron

## 2017-03-10 NOTE — ASSESSMENT & PLAN NOTE
HbA1c in 2015 is 6.8, now 5.9  Continue metformin on discharge  D/C ISS and Hypoglycemia protocol on discharge

## 2017-03-10 NOTE — ED PROVIDER NOTES
ED Provider Note    Scribed for Jerson Abraham M.D. by Ainsley Casey. 3/9/2017,  5:48 PM.    CHIEF COMPLAINT  Chief Complaint   Patient presents with   • Abnormal Labs     Chronic anemia       HPI  Juancho Patel is a 76 y.o. male who presents to the Emergency Department transfered from [facility] for a hemogolbin of 6.4. He has a history of chronic anemia esophagitis and severe dementia. There is no reported active bleeding. He had an endoscopy/gastroscopy in August for anemia which showed esophagitis and grade III/IV ulcerations, no colonscopy was performed.  Bedside rectal exam discloses no gross blood, but FIT card strongly positive.     Further history of present illness is unobtainable secondary to the patient's altered mental state.      REVIEW OF SYSTEMS  Review of Systems   Unable to perform ROS: dementia       PAST MEDICAL HISTORY   has a past medical history of CAD (coronary artery disease); HTN (hypertension); DM (diabetes mellitus) (CMS-HCC); COPD (chronic obstructive pulmonary disease) (CMS-HCC); CHI (closed head injury); Nasal fracture; Dyslipidemia; and Depression.    SOCIAL HISTORY  Social History     Social History Main Topics   • Smoking status: Smoker, Current Status Unknown   • Alcohol Use: No   • Drug Use: No     History   Drug Use No       SURGICAL HISTORY   has past surgical history that includes gastroscopy-endo (7/30/2016) and gastroscopy-endo (3/10/2017).    CURRENT MEDICATIONS  Home Medications     Reviewed by Kristin Leo R.N. (Registered Nurse) on 03/12/17 at 1334  Med List Status: Complete    Medication Last Dose Status    amlodipine (NORVASC) 5 MG Tab 3/9/2017 Active    cyanocobalamin (VITAMIN B-12) 500 MCG Tab 3/9/2017 Active    Divalproex Sodium (DEPAKOTE) 125 MG Capsule Delayed Release Sprinkle 3/9/2017 Active    docusate sodium (COLACE) 100 MG Cap 3/8/2017 Active    ferrous sulfate (IRON SUPPLEMENT) 325 (65 FE) MG tablet 3/9/2017 Active    folic acid (FOLVITE) 1  "MG Tab 3/9/2017 Active    lisinopril (PRINIVIL, ZESTRIL) 40 MG tablet 3/9/2017 Active    magnesium oxide (MAG-OX) 400 MG Tab 3/9/2017 Active    metformin (GLUCOPHAGE) 1000 MG tablet 3/9/2017 Active    omeprazole (PRILOSEC) 20 MG delayed-release capsule 3/9/2017 Active    phenytoin ER (DILANTIN) 100 MG Cap 3/9/2017 Active    senna-docusate (PERICOLACE OR SENOKOT S) 8.6-50 MG Tab 3/8/2017 Active    sertraline (ZOLOFT) 50 MG Tab 3/9/2017 Active    trazodone (DESYREL) 100 MG Tab 3/8/2017 Active                ALLERGIES  No Known Allergies    PHYSICAL EXAM  VITAL SIGNS: /52 mmHg  Pulse 75  Temp(Src) 36.6 °C (97.9 °F)  Resp 16  Ht 1.676 m (5' 5.98\")  Wt 58.968 kg (130 lb)  BMI 20.99 kg/m2  Pulse ox interpretation: Pulse ox difficult to measure due to patient movement and poor contact with finger, but normalizes on squeezing to finger.  Physical Exam   Constitutional: He appears unhealthy.  Non-toxic appearance. No distress.   HENT:   Head: Normocephalic and atraumatic.   Right Ear: External ear normal.   Left Ear: External ear normal.   Eyes: Conjunctivae are normal. Pupils are equal, round, and reactive to light. Right eye exhibits no discharge. Left eye exhibits no discharge.   Neck: Normal range of motion. Neck supple. No tracheal deviation present. No thyromegaly present.   Cardiovascular: Normal rate, regular rhythm and normal heart sounds.    Pulmonary/Chest: Effort normal and breath sounds normal. No respiratory distress.   Abdominal: Soft. Bowel sounds are normal. He exhibits no distension.   Genitourinary: Rectal exam shows no external hemorrhoid. Guaiac positive stool.   Neurological: He displays no tremor and facial symmetry.   Oriented to self only   Skin: Skin is warm, dry and intact.   Psychiatric: His affect is blunt. His affect is not labile.            DIAGNOSTIC STUDIES / PROCEDURES      LABS  Labs Reviewed   CBC WITH DIFFERENTIAL - Abnormal; Notable for the following:     RBC 3.61 (*)     " Hemoglobin 7.4 (*)     Hematocrit 26.8 (*)     MCV 74.2 (*)     MCH 20.5 (*)     MCHC 27.6 (*)     Neutrophils-Polys 75.70 (*)     Lymphocytes 19.10 (*)     All other components within normal limits   COMP METABOLIC PANEL - Abnormal; Notable for the following:     Glucose 142 (*)     Bun 23 (*)     ALT(SGPT) 54 (*)     Globulin 4.1 (*)     All other components within normal limits   COMP METABOLIC PANEL - Abnormal; Notable for the following:     Glucose 109 (*)     All other components within normal limits   DILANTIN - Abnormal; Notable for the following:     Phenytoin 2.0 (*)     All other components within normal limits   FERRITIN - Abnormal; Notable for the following:     Ferritin 6.6 (*)     All other components within normal limits   IRON/TOTAL IRON BIND - Abnormal; Notable for the following:     Iron <10 (*)     All other components within normal limits   CBC WITH DIFFERENTIAL - Abnormal; Notable for the following:     Monocytes 13.50 (*)     RBC 3.04 (*)     Hemoglobin 6.2 (*)     Hematocrit 22.4 (*)     MCV 73.7 (*)     MCH 20.4 (*)     MCHC 27.7 (*)     All other components within normal limits    Narrative:     SPECIMEN IS A RECOLLECT   RETICULOCYTES COUNT - Abnormal; Notable for the following:     Imm. Reticulocyte Fraction 24.4 (*)     Retic Hgb Equivalent 18.8 (*)     All other components within normal limits    Narrative:     SPECIMEN IS A RECOLLECT   HEMOGLOBIN A1C - Abnormal; Notable for the following:     Glycohemoglobin 5.9 (*)     All other components within normal limits    Narrative:     SPECIMEN IS A RECOLLECT   HEMOGLOBIN AND HEMATOCRIT - Abnormal; Notable for the following:     Hemoglobin 7.6 (*)     Hematocrit 27.4 (*)     All other components within normal limits   CBC WITH DIFFERENTIAL - Abnormal; Notable for the following:     Hemoglobin 7.6 (*)     Hematocrit 27.4 (*)     RBC 3.74 (*)     MCV 75.1 (*)     MCH 20.3 (*)     MCHC 27.0 (*)     Monocytes 14.60 (*)     All other components  within normal limits   CBC WITH DIFFERENTIAL - Abnormal; Notable for the following:     RBC 3.09 (*)     Hemoglobin 6.4 (*)     Hematocrit 22.8 (*)     MCV 73.8 (*)     MCH 20.7 (*)     MCHC 28.1 (*)     All other components within normal limits   COMP METABOLIC PANEL - Abnormal; Notable for the following:     Albumin 3.1 (*)     All other components within normal limits   CBC WITHOUT DIFFERENTIAL - Abnormal; Notable for the following:     RBC 3.05 (*)     Hemoglobin 6.4 (*)     Hematocrit 22.7 (*)     MCV 74.4 (*)     MCH 21.0 (*)     MCHC 28.2 (*)     All other components within normal limits   CBC WITH DIFFERENTIAL - Abnormal; Notable for the following:     RBC 3.80 (*)     Hemoglobin 8.4 (*)     Hematocrit 28.4 (*)     MCV 74.7 (*)     MCH 22.1 (*)     MCHC 29.6 (*)     Lymphocytes 14.90 (*)     Monos (Absolute) 0.96 (*)     All other components within normal limits   BASIC METABOLIC PANEL - Abnormal; Notable for the following:     Glucose 103 (*)     All other components within normal limits   ACCU-CHEK GLUCOSE - Abnormal; Notable for the following:     Glucose - Accu-Ck 102 (*)     All other components within normal limits   ACCU-CHEK GLUCOSE - Abnormal; Notable for the following:     Glucose - Accu-Ck 116 (*)     All other components within normal limits   ACCU-CHEK GLUCOSE - Abnormal; Notable for the following:     Glucose - Accu-Ck 115 (*)     All other components within normal limits   ACCU-CHEK GLUCOSE - Abnormal; Notable for the following:     Glucose - Accu-Ck 170 (*)     All other components within normal limits   COD (ADULT)    Narrative:     Does Physician's order indicate patient to be  transfused?->Yes   ESTIMATED GFR   DIFFERENTIAL MANUAL   PERIPHERAL SMEAR REVIEW   PLATELET ESTIMATE   MORPHOLOGY   PHOSPHORUS   ESTIMATED GFR   CEA   MAGNESIUM   PHOSPHORUS   ESTIMATED GFR   ESTIMATED GFR   ACCU-CHEK GLUCOSE   ACCU-CHEK GLUCOSE   ACCU-CHEK GLUCOSE   ACCU-CHEK GLUCOSE   RELEASE RED BLOOD CELLS    TRANSFUSE RED BLOOD CELLS-NURSING COMMUNICATION     All labs reviewed by me.    RADIOLOGY  DX-CHEST-LIMITED (1 VIEW)   Final Result      No evidence of acute cardiopulmonary process.        The radiologist's interpretation of all radiological studies have been reviewed by me.    COURSE & MEDICAL DECISION MAKING  Nursing notes, VS, PMSFHx reviewed in chart.     5:48 PM Patient seen and examined at bedside. Differential diagnosis includes but is not limited to peptic ulcer, gastritis, anemia of chronic disease, iron deficiency, gastrointestinal malignancy. Ordered for DX-chest, CBC, CMP, urinalysis, COD to evaluate. Patient will be treated with Iv fluids for his symptoms.     6:58 PM - Discussed the patient's case and the above findings with Dr. Hernandez (GI) who agrees to see patient tomorrow, requests call back if patient decompensates. CBC returns he will level of 7.4, no transfusion given.    7:09 PM - discussed patient with [facility], Dr. Hughes, agrees to admit patient.    EMSSADMITNOTE    FINAL IMPRESSION  1. Gastrointestinal hemorrhage, unspecified gastrointestinal hemorrhage type    2. Anemia due to other cause    3. Upper GI bleeding    4. Late onset Alzheimer's disease with behavioral disturbance         Ainsley WATSON (Scribe), am scribing for, and in the presence of, Jerson Abraham M.D..    Electronically signed by: Ainsley Casey (Scribraciel), 3/9/2017    Jerson WATSON M.D. personally performed the services described in this documentation, as scribed by Ainsley Casey in my presence, and it is both accurate and complete.    The note accurately reflects work and decisions made by me.  Jerson Abraham  3/21/2017  1:47 PM

## 2017-03-11 NOTE — PROGRESS NOTES
Report received. Shift assessment completed. Pt fatigued, but OX2. Pt VS stable, will reassess. Pt has no acute complaints at this time. Pt denies any pain or discomfort, IV iron started at this time, will monitor closely. Bed alarm in place. Bed in lowest position. Call light within reach. Will continue to monitor.

## 2017-03-11 NOTE — PROCEDURES
"DATE OF SERVICE:  03/10/2017    ENDOSCOPIST:  Alfredo Kay MD    PROCEDURE:  Esophagogastroduodenoscopy.    PREOPERATIVE DIAGNOSES:  1.  Anemia,  2.  Question of gastrointestinal bleed.  3.  Prior history of gastroesophageal reflux disease with ulcerative   esophagitis.    POSTOPERATIVE DIAGNOSES:  1.  Grade LA classification \"D\" ulcerative esophagitis over the distal 6 cm   just above the GE junction.  2.  Moderate hiatal hernia.    SEDATION:  2 mg Versed and 50 mcg fentanyl titrated per RN.    CONSENT:  Consent was obtained from power of  in patient with   underlying dementia.    DESCRIPTION OF PROCEDURE:  The patient was positioned in left lateral   decubitus.  He was stable for the procedure.  He was evaluated prior to   procedure.  Continuous cardiopulmonary monitoring was in place.    The patient was positioned in left lateral decubitus.  The duodenoscope was   advanced without difficulty up to the second portion of the duodenum.  It was   withdrawn in the stomach and retroflexed before complete withdrawal.  No   immediate complication occurred.  The patient tolerated the procedure well.    There was no blood loss.    FINDINGS:  ESOPHAGUS:  1.  Grade D LA classification of ulcerative esophagitis with circumferential   ulcerative change in the distal 6 cm confluent just above the GE junction.  2.  Hiatal hernia, moderate size.    STOMACH:  Normal.    DUODENUM:  Normal.    RECOMMENDATION:  1.  Protonix 40 mg b.i.d.  2.  Iron supplementation IV as well as p.o.  3.  Consideration for a repeat EGD in 3 months to assess healing.  If   symptoms, then follow up in the outpatient setting.    If repeat EGD is done, it would have to be done in the outpatient setting with   propofol assistance given the underlying dementia.       ____________________________________     Alfredo Kay MD EMD / NTS    DD:  03/10/2017 16:32:58  DT:  03/11/2017 04:42:29    D#:  [id number]  Job#:  [id number]  "

## 2017-03-11 NOTE — H&P
"March, 10, 2017    Alfredo Kay    EGD  PRE:  MARCO  ? GIB  H/O GERD esopahgitis      POST  Severe GR LA \"D\" ulcerative esophagitis over 6 cm distally  Moderate Hiatus hernia      SED  Versed and Fentanyl    REC:  PPI  IV iron    "

## 2017-03-11 NOTE — PROGRESS NOTES
Pt incontinent of stool and urine throughout night, unable to collect urine sample.     Monitor summary: SR 61-69 with frequent PVC's.

## 2017-03-11 NOTE — CARE PLAN
Problem: Safety  Goal: Will remain free from injury  Intervention: Provide assistance with mobility  Pt bed alarm in place, call light within reach and pt encouraged to use, bed in lowest position, will reassess.       Problem: Knowledge Deficit  Goal: Knowledge of disease process/condition, treatment plan, diagnostic tests, and medications will improve  Intervention: Assess knowledge level of disease process/condition, treatment plan, diagnostic tests, and medications  Pt updated on his POC, pt confused, but verbalized understanding, pt encouraged to ask any additional questions that come up, will reinforce PRN.

## 2017-03-11 NOTE — PROGRESS NOTES
[facility] Internal Medicine Interval Note    Name Juancho Patel       1940   Age/Sex 76 y.o. male   MRN [id number]   Code Status full     After 5PM or if no immediate response to page, please call for cross-coverage  Attending/Team: Ti Call [phone] to page   1st Call - Day Intern (R1):   [place] 2nd Call - Day Sr. Resident (R2/R3):   ;[place]         Chief complaint/ reason for interval visit (Primary Diagnosis)   Anemia    Interval Problem Daily Status Update    EGD done   Called daughter, ok to transfuse blood as needed, 1 unit today  SCD's   Diabetic diet    Active Hospital Problems    Diagnosis   • Upper GI bleeding [K92.2]   • Acute GI bleeding [K92.2]   • Anemia [D64.9]       Review of Systems   Unable to perform ROS: dementia       Consultants/Specialty  GI    Disposition  Inpatient being treated for GI bleed and iron deficiency anemia     Quality Measures  EKG reviewed, Labs reviewed, Medications reviewed and Radiology images reviewed  Iverson catheter: No Iverson        DVT prophylaxis - mechanical: SCDs  Ulcer prophylaxis: Yes (IV pantoprazole)              Physical Exam       Filed Vitals:    17 0000 17 0400 17 0800 17 1200   BP: 147/72 122/75 115/58 108/62   Pulse: 61 60 71 70   Temp: 36.7 °C (98.1 °F) 37.1 °C (98.7 °F) 37.1 °C (98.8 °F) 36.8 °C (98.3 °F)   Resp: 18 18 18 18   Height:       Weight:       SpO2: 98% 95% 93% 94%     Body mass index is 19.58 kg/(m^2). Weight: 55 kg (121 lb 4.1 oz)  Oxygen Therapy:  Pulse Oximetry: 94 %, O2 (LPM): 0, O2 Delivery: Nasal Cannula    Physical Exam  Gen: cachectic, not answering questions and not allowing a physical exam.   CVS: S1, S2, no murmur  CHEST: CTAB, no added sounds  ABD: NT, ND, +BS    Lab Data Review:      3/10/2017  2:30 PM    Recent Labs      17   1750  03/10/17   0559  17   0342   SODIUM  138  137  139   POTASSIUM  4.5  4.6  4.0   CHLORIDE  104  106  108   CO2  24   22  23   BUN  23*  21  12   CREATININE  0.69  0.68  0.54   MAGNESIUM   --    --   2.1   PHOSPHORUS   --   2.7  3.6   CALCIUM  9.5  8.5  8.9       Recent Labs      03/09/17   1750  03/10/17   0559  03/11/17 0342   ALTSGPT  54*  38  31   ASTSGOT  19  16  16   ALKPHOSPHAT  76  66  64   TBILIRUBIN  0.2  0.2  0.2   GLUCOSE  142*  109*  86       Recent Labs      03/10/17   0559  03/10/17   0753  03/10/17   1406  03/11/17 0342 03/11/17   0837   RBC   --   3.04*  3.74*  3.09*  3.05*   HEMOGLOBIN   --   6.2*  7.6*  7.6*  6.4*  6.4*   HEMATOCRIT   --   22.4*  27.4*  27.4*  22.8*  22.7*   PLATELETCT   --   280  276  299  294   IRON  <10*   --    --    --    --    FERRITIN   --   6.6*   --    --    --    TOTIRONBC  370   --    --    --    --        Recent Labs      03/09/17   1750  03/10/17   0559  03/10/17   0753  03/10/17   1406  03/11/17 0342 03/11/17   0837   WBC  6.8   --   5.6  5.5  5.3  5.9   NEUTSPOLYS  75.70*   --   58.90  57.90  54.50   --    LYMPHOCYTES  19.10*   --   24.00  23.60  27.50   --    MONOCYTES  3.50   --   13.50*  14.60*  12.50   --    EOSINOPHILS  1.70   --   2.70  2.40  4.20   --    BASOPHILS  0.00   --   0.70  1.10  0.90   --    ASTSGOT  19  16   --    --   16   --    ALTSGPT  54*  38   --    --   31   --    ALKPHOSPHAT  76  66   --    --   64   --    TBILIRUBIN  0.2  0.2   --    --   0.2   --            Assessment/Plan   Acute GI bleeding  Assessment & Plan  Hx of esophagitis with ulcerations of distal esophagus  Transferred from [facility] for low HB 6.4, in the ED Hb 7.4  NEVIN neg, FIT positive,  no melena    Hemodynamically stable  ED exam: palor, no abdominal masses, no tenderness   HB 7.4>>6.2>>7.6  >>6.4  EGD:    Grade LA classification C ulceration, esophagitis, distal with Mod H.Hernia   Got Iron IV    Plan:  Stop IVF  Start diet  Trend H & H QD  pantoprazole 40 mg BID  Cont PO Iron   Repeat EKD per GI in 3 months  Transfuse if Hb <6.5      Iron deficiency anemia due to chronic blood  loss  Assessment & Plan  Transferred from [facility] with HB 6.4  Likely slow upper GI bleed from esophageal ulcers  -Hb in ED 7.4 > 6.2 > 7.6  -per GI recs transfuse if below 6.5  -CEA wnl  -Iron <10, TIBC 370, ferritin 6.6      Plan:  -IV iron  -Monitor H&H QD  -Transfuse if Hb < 6.5      Controlled type 2 diabetes mellitus without complication (CMS-HCC)  Assessment & Plan  HbA1c in 2015 is 6.8, now 5.9  Hold metformin  On ISS and Hypoglycemia protocol     Essential hypertension  Assessment & Plan  Continue amlodipine 5 mg and Lisinopril 40 mg QD    Chronic obstructive pulmonary disease (CMS-HCC)  Assessment & Plan  RT per protocol  Continuous pulse oximetry  Oxygen therapy per protocol  Duonebs 4 hours PRN    Alzheimer's dementia with behavioral disturbance  Assessment & Plan  He was admitted to inpatient psych facility in 11/2016 for behavioral disturbance, inappropriate sexual behaviors.  Continue divalproex, phenytoin  Haldol PRN for agitation

## 2017-03-11 NOTE — PROGRESS NOTES
Pt resting quietly in bed, pt denies any needs at this time, pt linen change completed for incontinence, will reassess.

## 2017-03-11 NOTE — PROGRESS NOTES
[facility] paged to update on pt's hemoglobin now at 6.4, pt has had no acute changes this PM, 0900 morning hemoglobin ordered by on call resident, will pass to day shift RN to complete.

## 2017-03-12 NOTE — PROGRESS NOTES
Patient lethargic, sleepy. Refused AM vital signs. Refusing to wake up to take medications. LS clear, +BS, belly soft and nontender. No edema. Has not taken anything PO yet. Bed alarms on and call bell in reach. No signs of distress, sleeping soundly. Safety maintained

## 2017-03-12 NOTE — DISCHARGE INSTRUCTIONS
Discharge Instructions    Discharged to [facility] by [facility] with [place]. Discharged via [facility] transportation, hospital escort: van escort service.  Special equipment needed: None     Be sure to schedule a follow-up appointment with your primary care doctor or any specialists as instructed.     Discharge Plan:   Diet Plan: Discussed, Diabetic  Activity Level: Discussed  Confirmed Follow up Appointment: No (Comments), No Follow Up Indicated per MD   Confirmed Symptoms Management: Discussed  Medication Reconciliation Updated: Yes  Influenza Vaccine Indication: Patient Refuses, Unable to give consent for vaccines, Dementia     I understand that a diet low in cholesterol, fat, and sodium is recommended for good health. Unless I have been given specific instructions below for another diet, I accept this instruction as my diet prescription.   Other diet: Diabetic diet, low in concentrated carbohydrates and sugars     Special Instructions: Drink plenty of fluids.     · Is patient discharged on Warfarin / Coumadin?   No    · Is patient Post Blood Transfusion?  Yes    Gastrointestinal Bleeding  Gastrointestinal (GI) bleeding means there is bleeding somewhere along the digestive tract, between the mouth and anus.  CAUSES   There are many different problems that can cause GI bleeding. Possible causes include:  · Esophagitis. This is inflammation, irritation, or swelling of the esophagus.  · Hemorrhoids. These are veins that are full of blood (engorged) in the rectum. They cause pain, inflammation, and may bleed.  · Anal fissures. These are areas of painful tearing which may bleed. They are often caused by passing hard stool.  · Diverticulosis. These are pouches that form on the colon over time, with age, and may bleed significantly.  · Diverticulitis. This is inflammation in areas with diverticulosis. It can cause pain, fever, and bloody stools, although bleeding is rare.  · Polyps and cancer. Colon cancer often starts  out as precancerous polyps.  · Gastritis and ulcers. Bleeding from the upper gastrointestinal tract (near the stomach) may travel through the intestines and produce black, sometimes tarry, often bad smelling stools. In certain cases, if the bleeding is fast enough, the stools may not be black, but red. This condition may be life-threatening.  SYMPTOMS   · Vomiting bright red blood or material that looks like coffee grounds.  · Bloody, black, or tarry stools.  DIAGNOSIS   Your caregiver may diagnose your condition by taking your history and performing a physical exam. More tests may be needed, including:  · X-rays and other imaging tests.  · Esophagogastroduodenoscopy (EGD). This test uses a flexible, lighted tube to look at your esophagus, stomach, and small intestine.  · Colonoscopy. This test uses a flexible, lighted tube to look at your colon.  TREATMENT   Treatment depends on the cause of your bleeding.   · For bleeding from the esophagus, stomach, small intestine, or colon, the caregiver doing your EGD or colonoscopy may be able to stop the bleeding as part of the procedure.  · Inflammation or infection of the colon can be treated with medicines.  · Many rectal problems can be treated with creams, suppositories, or warm baths.  · Surgery is sometimes needed.  · Blood transfusions are sometimes needed if you have lost a lot of blood.  If bleeding is slow, you may be allowed to go home. If there is a lot of bleeding, you will need to stay in the hospital for observation.  HOME CARE INSTRUCTIONS   · Take any medicines exactly as prescribed.  · Keep your stools soft by eating foods that are high in fiber. These foods include whole grains, legumes, fruits, and vegetables. Prunes (1 to 3 a day) work well for many people.  · Drink enough fluids to keep your urine clear or pale yellow.  SEEK IMMEDIATE MEDICAL CARE IF:   · Your bleeding increases.  · You feel lightheaded, weak, or you faint.  · You have severe cramps in  your back or abdomen.  · You pass large blood clots in your stool.  · Your problems are getting worse.  MAKE SURE YOU:   · Understand these instructions.  · Will watch your condition.  · Will get help right away if you are not doing well or get worse.     This information is not intended to replace advice given to you by your health care provider. Make sure you discuss any questions you have with your health care provider.     Document Released: 12/15/2001 Document Revised: 12/04/2013 Document Reviewed: 11/26/2012  [employer] Interactive Patient Education ©2016 [employer]    2000 Calorie Diabetic Diet  The 2000 calorie diabetic diet is designed for eating up to 2000 calories each day. Following this diet and making healthy meal choices can help improve overall health. It controls blood glucose (sugar) levels. It can also lower blood pressure and cholesterol.  SERVING SIZES  Measuring foods and serving sizes helps to make sure you are getting the right amount of food. The list below tells how big or small some common serving sizes are.  · 1 oz.........4 stacked dice.   · 3 oz.........Deck of cards.   · 1 tsp........Tip of little finger.   · 1 tbs........Thumb.   · 2 tbs........Golf ball.   · ½ cup.......Half of a fist.   · 1 cup........A fist.   GUIDELINES FOR CHOOSING FOODS  The goal of this diet is to eat a variety of foods and limit calories to 2000 each day. This can be done by choosing foods that are low in calories and fat. The diet also suggests eating small amounts of food often. Doing this helps control your blood glucose levels so they do not get too high or too low. Each meal or snack should contain a protein food source to help you feel more satisfied and to stabilize your blood glucose. Try to eat about the same amount of food around the same time each day. This includes weekend days, travel days, and days off work. Space your meals about 4 to 5 hours apart and add a snack between them if you wish.  For  example, a daily food plan could include breakfast, a morning snack, lunch, dinner, and an evening snack. Healthy meals and snacks include whole grains, vegetables, fruits, lean meats, poultry, fish, and dairy products. As you plan your meals, choose a variety of foods. Choose from the bread and starches, vegetables, fruit, dairy, and meat/protein groups. Examples of foods from each group are listed below with their suggested serving sizes. Use measuring cups and spoons to become familiar with what a healthy portion looks like.  Bread and Starches  Each serving equals 15 grams of carbohydrates.  · 1 slice bread.   · ¼ bagel.   · ¾ cup or 1 cup cold cereal (unsweetened).   · ½ cup hot cereal or mashed potatoes.   · 1 small potato (size of a computer mouse).   ·  cup cooked pasta or rice.   · ½ English muffin.   · 1 cup broth-based soup.   · 3 cups popcorn.   · 4 to 6 whole-wheat crackers.   · ½ cup cooked beans, peas, or corn.   Vegetables  Each serving equals 5 grams of carbohydrates.  · ½ cup cooked vegetables.   · 1 cup raw vegetables.   · ½ cup tomato juice.   Fruit  Each serving equals 15 grams of carbohydrates.  · 1 small apple, banana, or orange.   · 1 ¼ cup watermelon or strawberries.   · ½ cup applesauce (no sugar added).   · 2 tbs raisins.   · ½ banana.   · ½ cup unsweetened canned fruit.   · ½ cup unsweetened fruit juice.   Dairy  Each serving equals 12 to 15 grams of carbohydrates.  · 1 cup fat-free milk.   · 6 oz artificially sweetened yogurt.   · 1 cup buttermilk.   · 1 cup soy milk.   Meat/Protein  · 1 large egg.   · 2 to 3 oz meat, poultry, or fish.   · ½ cup cottage cheese.   · 1 tbs peanut butter.   · ½ cup tofu.   · 1 oz cheese.   · ¼ cup tuna canned in water.   SAMPLE 2000 CALORIE DIET PLAN  Breakfast  · 1 English muffin (2 carb servings).   · Reduced fat cream cheese, 1 tbs.   · 1 scrambled egg.   · ½ grapefruit (1 carb serving).   · Fat-free milk, 1 cup (1 carb serving).   Morning  Snack  · Artificially sweetened yogurt, 6 oz (1 carb serving).   · 2 tbs chopped nuts.   · 1 small peach (1 carb serving).   Lunch  · Grilled chicken sandwich.   · 1 hamburger bun (2 carb servings).   · 2 oz chicken breast.   · 1 lettuce leaf.   · 2 slices tomato.   · Reduced fat mayonnaise, 1 tbs.   · Carrot sticks, 1 cup.   · Celery, 1 cup.   · 1 small apple (1 carb serving).   · Fat-free milk, 1 cup (1 carb serving).   Afternoon Snack  · ½ cup low-fat cottage cheese.   · 1 ¼ cups strawberries (1 carb serving).   Dinner  · Steak fajitas.   · 2 oz lean steak.   · 1 whole-wheat tortilla, 8 inches (1 ½ carb servings).   · Shredded lettuce, ¼ cup.   · 2 slices tomato.   · Salsa, ¼ cup.   · Low-fat sour cream, 2 tbs.   · Brown rice,  cup (1 carb serving).   · 1 small orange (1 carb serving).   Evening Snack  · 4 reduced fat whole-wheat crackers (1 carb serving).   · 1 tbs peanut butter.   · 12 to 15 grapes (1 carb serving).   MEAL PLAN  Use this worksheet to help you make a daily meal plan based on the 2000 calorie diabetic diet suggestions. The total amount of carbohydrates in your meal or snack is more important than making sure you include all of the food groups at every meal or snack. If you are using this plan to help you control your blood glucose, you may interchange carbohydrate containing foods (dairy, starches, and fruits). Choose a variety of fresh foods of varying colors and flavors. You can choose from the following foods to build your day's meals:  · 11 Starches.   · 4 Vegetables.   · 3 Fruits.   · 3 Dairy.   · 8 oz Meat.   · Up to 6 Fats.   Your dietician can use this worksheet to help you decide how many servings and what types of foods are right for you.  BREAKFAST  Food Group and Servings / Food Choice  Starches ___________________________________________  Dairy ______________________________________________  Fruit ______________________________________________  Meat  ______________________________________________  Fat________________________________________________  LUNCH  Food Group and Servings / Food Choice  Starch _____________________________________________  Meat ______________________________________________  Vegetables _________________________________________  Fruit ______________________________________________  Dairy______________________________________________  Fat________________________________________________  AFTERNOON SNACK  Food Group and Servings / Food Choice  Starch________________________________________________  Meat_________________________________________________  Fruit__________________________________________________  DINNER  Food Group and Servings / Food Choice  Starches ____________________________________________  Meat _______________________________________________  Dairy _______________________________________________  Vegetables __________________________________________  Fruit ________________________________________________  Fat_________________________________________________  EVENING SNACK  Food Group and Servings / Food Choice  Fruit _______________________________________________  Meat _______________________________________________  Starch ______________________________________________  DAILY TOTALS  Starches ________________________  Vegetables ______________________  Fruit ___________________________  Dairy ___________________________  Meat ___________________________  Fat _____________________________  Document Released: 07/10/2006 Document Revised: 03/11/2013 Document Reviewed: 07/26/2010  ExitCare® Patient Information ©2013 [facility].    Depression / Suicide Risk    As you are discharged from this [facility] facility, it is important to learn how to keep safe from harming yourself.    Recognize the warning signs:  · Abrupt changes in personality, positive or negative- including increase in energy   · Giving away  possessions  · Change in eating patterns- significant weight changes-  positive or negative  · Change in sleeping patterns- unable to sleep or sleeping all the time   · Unwillingness or inability to communicate  · Depression  · Unusual sadness, discouragement and loneliness  · Talk of wanting to die  · Neglect of personal appearance   · Rebelliousness- reckless behavior  · Withdrawal from people/activities they love  · Confusion- inability to concentrate     If you or a loved one observes any of these behaviors or has concerns about self-harm, here's what you can do:  · Talk about it- your feelings and reasons for harming yourself  · Remove any means that you might use to hurt yourself (examples: pills, rope, extension cords, firearm)  · Get professional help from the community (Mental Health, Substance Abuse, psychological counseling)  · Do not be alone:Call your Safe Contact- someone whom you trust who will be there for you.  · Call your local CRISIS HOTLINE 779-1527 or 880-297-2888  · Call your local Children's Mobile Crisis Response Team [place] (248) 458-0408 or www.[employer]  · Call the toll free National Suicide Prevention Hotlines   · National Suicide Prevention Lifeline 691-443-SUDY (2641)  · National Hope Line Network 800-SUICIDE (622-3203)

## 2017-03-12 NOTE — PROGRESS NOTES
Pt observed, no change from original assessment, vss, no c/o pain at this time  Pt AAOx2, no other signs or symptoms of distress, fall precautions in place, call light within reach, all questions answered, will continue to monitor. In a sinus rhythm without ectopy.

## 2017-03-12 NOTE — PROGRESS NOTES
Attending Note:  I have personally evaluated and examined this patient and agree with the findings as documented in the resident note except as documented in this attending note.  I am actively involved in the patient's care.    Severe dementia patient admitted with Fe deficiency anemia from chronic GI blood loss.  EGD with esophagitis and esophageal ulceration as the likely cause.  ASA stopped and should not be restarted due to his ongoing anemia as the risk is greater than benefit.  His dementia appears very severe as he is not able to communicate and is not eating well as evidenced by cachexia.  I would recommend DNR but it has been difficult to contact next of kin.      Dr. Noonan assuming attending role 3/13

## 2017-03-12 NOTE — PROGRESS NOTES
Orders for one unit of blood to be infused for low H/H. Notified patient's daughter who gave phone consent with two RN;s witnessing the call. Patient did agree to blood transfusion.

## 2017-03-12 NOTE — PROGRESS NOTES
Patient cleared for discharge by MD. This RN to give report to [facility] over the phone. Patient alert to self only, unable to participate in any discharge teaching or instructions. Written education provided with discharge instructions. No follow up care ordered. Patient ate lunch prior to leaving facility, IV removed. Left with all personal belongings, monitor D.C'd by MD , monitor room aware

## 2017-03-12 NOTE — DISCHARGE PLANNING
Received transport form from [facility] at 1309. Contacted [facility] spoke to Thang they will accept patient today. Transportation arranged with [place] via [facility] van to [facility] at 1500. Notified LAURIE Nation and Thang with [facility] via phone.

## 2017-03-12 NOTE — DISCHARGE SUMMARY
[facility] Discharge Summary      Admit Date:  3/9/2017       Discharge Date:   3/12/17    Service:   R Internal Medicine Purple Team  Attending Physician(s):   Odell       Senior Resident(s):   Andre  John Resident(s):   [place]      Primary Diagnosis:   Upper GI bleed    Secondary Diagnoses:                Active Hospital Problems    Diagnosis   • Controlled type 2 diabetes mellitus without complication (CMS-[facility]) [E11.9]   • Essential hypertension [I10]   • Chronic obstructive pulmonary disease (CMS-[facility]) [J44.9]   • Alzheimer's dementia with behavioral disturbance [G30.8, F02.81]   • Upper GI bleeding [K92.2]   • Acute GI bleeding [K92.2]   • Iron deficiency anemia due to chronic blood loss [D50.0]       Hospital Summary (Brief Narrative):       Juancho Patel is a 76 y.o. male who presented to the ED transfered from [facility] for a hemogolbin of 6.4. He has a history of chronic anemia esophagitis and severe dementia. During admission patient's hemoglobin dropped to 6.2. Patient was asymptomatic. He was transfused one unit of packed red blood cells with permission from daughter, as well as IV iron due to severe iron deficiency anemia likely from slow GI bleed. GI was consulted and performed an EGD which showed Grade LA classification C ulceration, esophagitis, distal with Mod H.Hernia and CEA was within normal limits. Patient now medically stable for discharge to SNF. Hb has remained stable. Recommend patient continue oral ferrous sulfate and follow-up with PCP as outpatient.    Patient /Hospital Summary (Details -- Problem Oriented) :          Acute GI bleeding  Assessment & Plan  Hx of esophagitis with ulcerations of distal esophagus  Transferred from [facility] for low HB 6.4, in the ED Hb 7.4  NEVIN neg, FIT positive,  no melena    Hemodynamically stable  ED exam: palor, no abdominal masses, no tenderness   HB 7.4>>6.2>>7.6 >> now 8.4    Plan:  Iron PO  Omeprazole home  dose        Iron deficiency anemia due to chronic blood loss  Assessment & Plan  Transferred from [facility] with HB 6.4  Likely slow upper GI bleed from esophageal ulcers  -Hb in ED 7.4 > 6.2 > 7.6  -CEA wnl  -Iron <10, TIBC 370, ferritin 6.6      Plan:  -Continue PO iron                    Controlled type 2 diabetes mellitus without complication (CMS-HCC)  Assessment & Plan  HbA1c in 2015 is 6.8, now 5.9  Continue metformin on discharge  D/C ISS and Hypoglycemia protocol on discharge     Essential hypertension  Assessment & Plan  Continue amlodipine 5 mg and Lisinopril 40 mg QD    Chronic obstructive pulmonary disease (CMS-HCC)  Assessment & Plan  RT per protocol  Continuous pulse oximetry  Oxygen therapy per protocol  Duonebs 4 hours PRN    Alzheimer's dementia with behavioral disturbance  Assessment & Plan  He was admitted to inpatient psych facility in 11/2016 for behavioral disturbance, inappropriate sexual behaviors.  Continue divalproex, phenytoin  Haldol PRN for agitation      Consultants:     GI    Procedures:        EGD    Imaging/ Testing:      DX-CHEST-LIMITED (1 VIEW)   Final Result      No evidence of acute cardiopulmonary process.          Discharge Medications:         Medication Reconciliation: Completed    Juancho Patel   Home Medication Instructions NANCY:[id number]    Printed on:03/12/17 1413   Medication Information                      amlodipine (NORVASC) 5 MG Tab  Take 5 mg by mouth every day.             cyanocobalamin (VITAMIN B-12) 500 MCG Tab  Take 500 mcg by mouth every day.             Divalproex Sodium (DEPAKOTE) 125 MG Capsule Delayed Release Sprinkle  Take 125 mg by mouth 3 times a day.             docusate sodium (COLACE) 100 MG Cap  Take 100 mg by mouth every evening.             ferrous sulfate (IRON SUPPLEMENT) 325 (65 FE) MG tablet  Take 325 mg by mouth every day.             folic acid (FOLVITE) 1 MG Tab  Take 1 mg by mouth every day.             lisinopril (PRINIVIL, ZESTRIL)  40 MG tablet  Take 40 mg by mouth every day.             magnesium oxide (MAG-OX) 400 MG Tab  Take 400 mg by mouth 2 times a day.             metformin (GLUCOPHAGE) 1000 MG tablet  Take 1,000 mg by mouth 2 times a day, with meals.             omeprazole (PRILOSEC) 20 MG delayed-release capsule  Take 20 mg by mouth every day.             phenytoin ER (DILANTIN) 100 MG Cap  Take 100 mg by mouth every day.             senna-docusate (PERICOLACE OR SENOKOT S) 8.6-50 MG Tab  Take 1 Tab by mouth every bedtime.             sertraline (ZOLOFT) 50 MG Tab  Take 50 mg by mouth every day.             trazodone (DESYREL) 100 MG Tab  Take 100 mg by mouth every evening.               Disposition:   SNF    Diet:   HEALTHY    Activity:   AS TOLERATED    Instructions:      Return if HB continues to drop    The patient was instructed to return to the ER in the event of worsening symptoms. I have counseled the patient on the importance of compliance and the patient has agreed to proceed with all medical recommendations and follow up plan indicated above.   The patient understands that all medications come with benefits and risks. Risks may include permanent injury or death and these risks can be minimized with close reassessment and monitoring.        Primary Care Provider:      Discharge summary faxed to primary care provider:  Completed  Copy of discharge summary given to the patient: Completed    Follow up appointment details :        None  Pending Studies:        None    Time spent on discharge day patient visit, preparing discharge paperwork and arranging for patient follow up.    Summary of follow up issues:   None    Discharge Time (Minutes) : 30 minutes      Condition on Discharge    ______________________________________________________________________    Interval history/exam for day of discharge:    Refused to answer my questions today. He also may get out of the room.    Filed Vitals:    03/11/17 2000 03/12/17 0000 03/12/17  0400 03/12/17 0845   BP: 118/76 110/70 109/60    Pulse: 65 78 80    Temp: 37.4 °C (99.4 °F) 37.2 °C (98.9 °F) 36.8 °C (98.3 °F)    Resp: 16 19 17    Height:       Weight: 55.7 kg (122 lb 12.7 oz)      SpO2: 94% 96% 92% 92%     Weight/BMI: Body mass index is 19.83 kg/(m^2).  Pulse Oximetry: 92 %, O2 (LPM): 2, O2 Delivery: None (Room Air)    General: Ill tempered, disheveled, will not allow for physical exam    Most Recent Labs:    Lab Results   Component Value Date/Time    WBC 7.7 03/12/2017 01:26 AM    RBC 3.80* 03/12/2017 01:26 AM    HEMOGLOBIN 8.4* 03/12/2017 01:26 AM    HEMATOCRIT 28.4* 03/12/2017 01:26 AM    MCV 74.7* 03/12/2017 01:26 AM    MCH 22.1* 03/12/2017 01:26 AM    MCHC 29.6* 03/12/2017 01:26 AM    MPV 9.8 03/12/2017 01:26 AM    NEUTROPHILS-POLYS 68.50 03/12/2017 01:26 AM    LYMPHOCYTES 14.90* 03/12/2017 01:26 AM    MONOCYTES 12.50 03/12/2017 01:26 AM    EOSINOPHILS 2.60 03/12/2017 01:26 AM    BASOPHILS 0.90 03/12/2017 01:26 AM    HYPOCHROMIA 1+ 03/10/2017 07:53 AM    ANISOCYTOSIS 2+ 03/10/2017 07:53 AM      Lab Results   Component Value Date/Time    SODIUM 136 03/12/2017 01:26 AM    POTASSIUM 3.8 03/12/2017 01:26 AM    CHLORIDE 103 03/12/2017 01:26 AM    CO2 24 03/12/2017 01:26 AM    GLUCOSE 103* 03/12/2017 01:26 AM    BUN 11 03/12/2017 01:26 AM    CREATININE 0.59 03/12/2017 01:26 AM      Lab Results   Component Value Date/Time    ALT(SGPT) 31 03/11/2017 03:42 AM    AST(SGOT) 16 03/11/2017 03:42 AM    ALKALINE PHOSPHATASE 64 03/11/2017 03:42 AM    TOTAL BILIRUBIN 0.2 03/11/2017 03:42 AM    ALBUMIN 3.1* 03/11/2017 03:42 AM    GLOBULIN 2.9 03/11/2017 03:42 AM    PRE-ALBUMIN 27.0 03/30/2015 03:20 AM    INR 1.12 07/29/2016 09:45 PM     Lab Results   Component Value Date/Time    PT 14.4 07/29/2016 09:45 PM    INR 1.12 07/29/2016 09:45 PM

## 2017-03-12 NOTE — CARE PLAN
Problem: Psychosocial Needs:  Goal: Level of anxiety will decrease  Outcome: PROGRESSING AS EXPECTED  No s/s anxiety this shift

## 2017-03-12 NOTE — CARE PLAN
Problem: Skin Integrity  Goal: Risk for impaired skin integrity will decrease  Outcome: PROGRESSING AS EXPECTED  Patient incontinent of B+B this shift, skin checked, barrier cream applied, patient rolled on his side. Skin intact at this time

## 2017-03-12 NOTE — PROGRESS NOTES
Fall precautions in place: treaded slipper socks on, mobility signs posted, hourly rounding, bed in lowest position, belongings and call light are within reach, bed alarm on, and near nurses station.

## 2017-03-12 NOTE — PROGRESS NOTES
Patient left the floor at 1505 via [employer] service with transport, had signed [employer] with him from , discharge summary and all appropriate paperwork. No personal belongings found in room. [facility] aware that patient is on his way. Core Measure sheet filled out by this RN. Patient refusing any vaccines this admission, although he is confused. This RN spoke with [facility] (where patient normally resides) and they were unable to update me on any further vaccine dates or information, and PCP office is closed today.

## 2017-03-12 NOTE — PROGRESS NOTES
Spoke to primary physician about patient's status. He at times will not answer questions and gets agitated with staff when providing ADL's. Refuses to take medications and will spit out food when provided. Will continue to provide nutrition and bathroom assistance.

## 2017-03-12 NOTE — PROGRESS NOTES
Patient woke up at this time and took all morning pills. Repeats inappropriate phrases with nursing staff. Incontinent of B+B. Danni care given. Took pills crushed with apple sauce and ate 100% of breakfast. In bed with alarm on and call bell in hand, awake and alert.

## 2017-03-12 NOTE — PROGRESS NOTES
Patient is less agitated and is now willing to take medications but still yells when assistance is provided. Will continue to observe closely.

## 2017-03-12 NOTE — PROGRESS NOTES
Patient turning and repositioning in bed frequently. MD order to D/C telemetry, box removed. Patient awake and alert, had 1 unit insulin for SS coverage prior to lunch. Alarms on

## 2017-10-16 PROBLEM — A41.9 SEPSIS (HCC): Status: ACTIVE | Noted: 2017-01-01

## 2017-10-16 PROBLEM — J18.9 PNA (PNEUMONIA): Status: ACTIVE | Noted: 2017-01-01

## 2017-10-17 NOTE — ED PROVIDER NOTES
ED Provider Note    CHIEF COMPLAINT  Chief Complaint   Patient presents with   • Unresponsive        HPI    Primary care provider: Travis Delcid M.D.   History obtained from: EMS   History limited by: Patient's clinical status     Juancho Patel is a 77 y.o. male who presents to the ED for difficulty breathing and possible aspiration according to EMS. Limited history/review of systems/physical exam from the patient due to his baseline altered mental status. Per EMS, the patient noted to have increased difficulty breathing today while at the skilled nursing facility. He apparently vomited after eating with concern for aspiration pneumonia. No fever noted. He does arrive into the ED with paperwork indicating DNR including no mechanical ventilation.    REVIEW OF SYSTEMS  Please see HPI for pertinent positives/negatives.    PAST MEDICAL HISTORY  Past Medical History:   Diagnosis Date   • CAD (coronary artery disease)    • CHI (closed head injury)     rt frontal bleed in 2008   • COPD (chronic obstructive pulmonary disease) (CMS-HCC)    • Depression    • DM (diabetes mellitus) (CMS-HCC)    • Dyslipidemia    • HTN (hypertension)    • Nasal fracture         SURGICAL HISTORY  Past Surgical History:   Procedure Laterality Date   • GASTROSCOPY-ENDO  3/10/2017    Procedure: GASTROSCOPY-ENDO;  Surgeon: Alfredo Izquierdo M.D.;  Location: ENDOSCOPY [facility];  Service:    • GASTROSCOPY-ENDO  7/30/2016    Procedure: GASTROSCOPY-ENDO;  Surgeon: Smith Alvarado M.D.;  Location: ENDOSCOPY [facility];  Service:         SOCIAL HISTORY  Social History     Social History Main Topics   • Smoking status: Smoker, Current Status Unknown   • Smokeless tobacco: Not on file   • Alcohol use No   • Drug use: No   • Sexual activity: Not on file        FAMILY HISTORY  No family history on file.     CURRENT MEDICATIONS  Home Medications     Reviewed by Jose D Cantu (Pharmacy Tech) on 10/16/17 at 2307  Med List Status:  "Complete   Medication Last Dose Status   amlodipine (NORVASC) 5 MG Tab 10/16/2017 Active   cefTRIAXone (ROCEPHIN) 1 GM Recon Soln 10/10/2017 Active   docusate sodium (COLACE) 100 MG Cap 10/16/2017 Active   magnesium oxide (MAG-OX) 400 MG Tab 10/16/2017 Active   metformin (GLUCOPHAGE) 500 MG Tab 10/16/2017 Active   phenytoin ER (DILANTIN) 100 MG Cap 10/16/2017 Active   trazodone (DESYREL) 100 MG Tab 10/16/2017 Active                 ALLERGIES  No Known Allergies     PHYSICAL EXAM  VITAL SIGNS: BP (!) 84/49   Pulse (!) 127 Comment: Simultaneous filing. User may not have seen previous data.  Temp 37.1 °C (98.7 °F)   Resp (!) 34 Comment: Simultaneous filing. User may not have seen previous data.  Ht 1.676 m (5' 6\")   Wt 55.7 kg (122 lb 12.7 oz)   SpO2 (!) 54% Comment: Simultaneous filing. User may not have seen previous data.  BMI 19.82 kg/m²  @LENIN[[id number]::@     Pulse ox interpretation:86% I interpret this pulse ox as abnormal     Constitutional: Thin patient with respiratory distress, not responding to verbal or painful stimuli  HENT: No external signs of trauma, normocephalic, oropharynx dry  Eyes: PERRL, conjunctiva without erythema, no discharge, no icterus    Neck: Soft and supple, trachea midline   Cardiovascular: Regular rate and rhythm, no murmurs/rubs/gallops, weak distal pulses with poor perfusion    Thorax & Lungs: Tachypnea with bilateral diffuse coarse breath sounds  Abdomen: Soft, nontender, nondistended, no guarding, no rebound   Extremities: Mottling in extremities, no edema, no gross deformity  Skin: Cool with mottling and pallor no rash noted    Lymphatic: No lymphadenopathy noted    Neuro: Lethargic and not responsive  Psychiatric: Unable to obtain          DIAGNOSTIC STUDIES / PROCEDURES        LABS  All labs reviewed by me.     Results for orders placed or performed during the hospital encounter of 10/16/17   CBC WITH DIFFERENTIAL   Result Value Ref Range    WBC 11.7 (H) 4.8 - 10.8 K/uL    " RBC 2.87 (L) 4.70 - 6.10 M/uL    Hemoglobin 7.7 (L) 14.0 - 18.0 g/dL    Hematocrit 28.5 (L) 42.0 - 52.0 %    MCV 99.3 (H) 81.4 - 97.8 fL    MCH 26.8 (L) 27.0 - 33.0 pg    MCHC 27.0 (L) 33.7 - 35.3 g/dL    RDW 56.3 (H) 35.9 - 50.0 fL    Platelet Count 278 164 - 446 K/uL    MPV 12.6 9.0 - 12.9 fL    Neutrophils-Polys 64.00 44.00 - 72.00 %    Lymphocytes 20.20 (L) 22.00 - 41.00 %    Monocytes 4.40 0.00 - 13.40 %    Eosinophils 0.00 0.00 - 6.90 %    Basophils 0.00 0.00 - 1.80 %    Nucleated RBC 1.00 /100 WBC    Neutrophils (Absolute) 8.72 (H) 1.82 - 7.42 K/uL    Lymphs (Absolute) 2.36 1.00 - 4.80 K/uL    Monos (Absolute) 0.51 0.00 - 0.85 K/uL    Eos (Absolute) 0.00 0.00 - 0.51 K/uL    Baso (Absolute) 0.00 0.00 - 0.12 K/uL    NRBC (Absolute) 0.12 K/uL    Anisocytosis 2+     Macrocytosis 1+     Microcytosis 1+    COMP METABOLIC PANEL   Result Value Ref Range    Sodium 146 (H) 135 - 145 mmol/L    Potassium 4.4 3.6 - 5.5 mmol/L    Chloride 113 (H) 96 - 112 mmol/L    Co2 13 (L) 20 - 33 mmol/L    Anion Gap 20.0 (H) 0.0 - 11.9    Glucose 419 (H) 65 - 99 mg/dL    Bun 59 (H) 8 - 22 mg/dL    Creatinine 1.32 0.50 - 1.40 mg/dL    Calcium 8.4 (L) 8.5 - 10.5 mg/dL    AST(SGOT) 11 (L) 12 - 45 U/L    ALT(SGPT) 35 2 - 50 U/L    Alkaline Phosphatase 67 30 - 99 U/L    Total Bilirubin 0.1 0.1 - 1.5 mg/dL    Albumin 2.9 (L) 3.2 - 4.9 g/dL    Total Protein 5.6 (L) 6.0 - 8.2 g/dL    Globulin 2.7 1.9 - 3.5 g/dL    A-G Ratio 1.1 g/dL   TROPONIN   Result Value Ref Range    Troponin I 0.02 0.00 - 0.04 ng/mL   BTYPE NATRIURETIC PEPTIDE   Result Value Ref Range    B Natriuretic Peptide 59 0 - 100 pg/mL   LACTIC ACID   Result Value Ref Range    Lactic Acid 12.7 (HH) 0.5 - 2.0 mmol/L   MAGNESIUM   Result Value Ref Range    Magnesium 2.8 (H) 1.5 - 2.5 mg/dL   ESTIMATED GFR   Result Value Ref Range    GFR If African American >60 >60 mL/min/1.73 m 2    GFR If Non  53 (A) >60 mL/min/1.73 m 2   DIFFERENTIAL MANUAL   Result Value Ref Range     Bands-Stabs 10.50 (H) 0.00 - 10.00 %    Metamyelocytes 0.90 %    Manual Diff Status PERFORMED    PERIPHERAL SMEAR REVIEW   Result Value Ref Range    Peripheral Smear Review see below    PLATELET ESTIMATE   Result Value Ref Range    Plt Estimation Normal    MORPHOLOGY   Result Value Ref Range    RBC Morphology Present     Poikilocytosis 1+     Ovalocytes 1+     Schistocytes 1+         RADIOLOGY  The radiologist's interpretation of all radiological studies have been reviewed by me.     DX-CHEST-PORTABLE (1 VIEW)   Final Result         1. Ill-defined airspace opacity in the right lower lobe, in keeping with pneumonia.             COURSE & MEDICAL DECISION MAKING  Nursing notes, VS, PMSFHx reviewed in chart.     Review of past medical records shows the patient was seen in this ED on March 9, 2017 and admitted for anemia and GI bleed.    Differential diagnoses considered include but are not limited to: AMI, pericardial effusion/tamponade, pericarditis, CHF/pulm edema, PE, pneumothorax, pneumonia, pleural effusion, COPD, asthma, bronchospasm, bronchitis, respiratory infection, respiratory failure, DKA, sepsis, metabolic acidosis, anxiety/hyperventilation     2255: D/W [facility] IM ICU resident who agreed to admit patient for further care    Patient was brought by EMS from his nursing facility due to increased difficulty breathing tonight after he vomited with eating with concern for aspiration pneumonia. Patient arrived very dyspneic with hypoxia. His paperwork indicates DNR including no mechanical ventilation. Patient was given high flow oxygen via nasal cannula with subsequent improvement of his pulse ox. He was noted to be hypotensive and aggressive fluid resuscitation was initiated due to concern for sepsis. Chest x-ray suggests a right lower lobe pneumonia. His lactate was elevated along with an anion gap acidosis and evidence of worsening renal function compared to previous results. Antibiotics was initiated after  blood cultures. [facility] internal medicine was contacted and agreed to admit patient    CRITICAL CARE NOTE:  Total critical care time spent on this patient was 45 minutes exclusive of separately billable procedures.  This may include direct bedside patient care, speaking with family members, review of past medical records, reviewing the results of laboratory/diagnostic studies, consulting with other physicians, as well as evaluating the response to the therapy instituted.          FINAL IMPRESSION  1. Pneumonia of right lower lobe due to infectious organism (CMS-HCC)    2. Shortness of breath    3. Hypoxia    4. Other specified hypotension    5. Elevated lactic acid level    6. Chronic anemia    7. Increased anion gap metabolic acidosis    8. Hyperglycemia    9. ELOY (acute kidney injury) (CMS-[facility])           DISPOSITION  Patient will be admitted to ICU by [facility] IM      Electronically signed by: Oscar Carrasquillo, 10/16/2017 10:02 PM      Portions of this record were made with voice recognition software.  Despite my review, spelling/grammar/context errors may still remain.  Interpretation of this chart should be taken in this context.

## 2017-10-17 NOTE — DISCHARGE PLANNING
Medical Social Work    Referral: Critical Patient    Intervention: MSW responded to RD08 for a critical patient.  Pt is a 77 year old male brought in by [facility] from [facility].  Pt is Juancho Patel (: 1940).  [facility].  Per paperwork from [facility] pt's emergency contact is his daughter, Lakshmi Scherer ([phone]).  MSW attempted to contact pt's daughter; however, number was disconnected.  MSW contacted Kerry with [facility] who states that pt's daughter is involved in pt's care; however, pt is a alcantara of the [facility].  Pt's guardian is Adams Rivera with [facility] Guardian's office ([phone] or [phone]).  MSW attempted to contact pt's guardian.  Message was left on pt's guardian's voice mail requesting a return phone call regarding pt's hospitalization.  Per message on business phone should on-call guardian need to be contacted the on-call pager can be called at: [phone] and return phone number can be put in for a return phone call.      Plan: Guardian to be called should pt continue to deteriorate.

## 2017-10-17 NOTE — ED NOTES
Med rec updated and complete.  Allergies reviewed.  Pt  Is unable at this time to participate in an interview at this time.  Med rec updated based on MARS from transferring facility.

## 2017-10-17 NOTE — DISCHARGE SUMMARY
Internal Medicine Death Summary  Note Author: Otoniel Young M.D.       Admit Date:  10/16/2017       Date of Death:   10/17/17    Service:   [facility] Internal Medicine Gold Team  Attending Physician(s):   Dr Kenyon   Senior Resident(s):   Otoniel Young MD   John Resident(s):   None       Cause of Death:   Cardiorespiratory arrest     Diagnoses:            Active Problems:    Sepsis (CMS-HCC) POA: Unknown    PNA (pneumonia) POA: Unknown  Resolved Problems:    * No resolved hospital problems. *      Hospital Summary (Brief Narrative):       76 yo male, SNF resident, with PMH of progressive Alzheimer's dementia, COPD, DM, MARCO, HTN was admitted for acute respiratory distress with dyspnea and concerns for aspiration with worsened AMS from his baseline. Pt was non verbal at baseline.   On arrival to ED, he was hypoxic , sepsis possibly from pulmonary source. his LA was 12 and CXR suggesting of PNA most likely aspiration. He was given O2 via NC 70 L with FiO2 100% with sats of 89-90. He was not a BiPAP candidate. Pt was a alcantara of state and was DNR and DNI . He had a daughter, multiple attempts have been made to reach her, she was inaccessible. He was transitioned to the comfort care and passed away on 10/17/17 at 1:05 AM    Patient /Hospital Summary (Details -- Problem Oriented) :       Sepsis 2/2 pulmonary source   - prognosis is poor secondary to co morbidities including progressive dementia  - DNR/DNI on comfort care     Other chronic issues   - DNR/DNI with comfort care    Consultants:     Pulmonary     Procedures:        none    Imaging/ Testing:      CXR

## 2017-10-17 NOTE — ED NOTES
OSCAR from Lab called with critical result of lactic 12.7 at 2223. Critical lab result read back to OSCAR.   Dr. Carrasquillo notified of critical lab result at 2224.  Critical lab result read back by Dr. Carrasquillo.

## 2017-10-17 NOTE — CONSULTS
DATE OF SERVICE:  10/16/2017    PULMONARY CRITICAL CARE CONSULT    REQUESTING PHYSICIAN:  Dr. Oscar Carrasquillo    REASON FOR REQUEST:  Acute hypoxic respiratory failure.    HISTORY OF PRESENT ILLNESS:  The patient currently poorly responsive, not able   to verbalize, all information taken from chart review as well sign out.  The   patient appears to be a 77-year-old gentleman with known longstanding history   of diabetes, hypertension, COPD, dyslipidemia, history of GI bleed and severe   underlying Alzheimer disease.  Furthermore, it appears that patient is a alcantara   of the [facility] and equally patient is DNR/DNI.  The patient was transferred from   skilled nursing facility for increasing respiratory distress and concern for   possible aspiration.  Further, he has a baseline altered mental state that   perhaps may be worse than baseline.  Upon arrival to the emergency department,   the patient noted to be significantly hypoxic.  He was placed on high flow   nasal cannula at 70 liters and 100% FiO2, maintaining sats of approximately   89-90, not a BiPAP candidate and again is DNI.  Furthermore, it appears the   patient is in septic shock with primary source being pulmonary _____   underlying with possible aspiration.  No further history at this time is   currently obtainable.    ALLERGIES:  No known drug allergies.    OUTPATIENT MEDICATIONS:  Include metformin, ceftriaxone, magnesium, Dilantin,   trazodone, amlodipine, and docusate.    FAMILY HISTORY:  Unable to obtain.    PAST MEDICAL HISTORY:  As indicated above in HPI.    SOCIAL HISTORY:  Unable to obtain as the patient living in skilled nursing   facility.    REVIEW OF SYSTEMS:  Unable to obtain.    PHYSICAL EXAMINATION:  VITAL SIGNS:  Temperature 37.1, pulse rate 123, blood pressure 88/48, satting   90% on 70 liters, 100% FiO2.  GENERAL:  The patient is in significant respiratory distress, significantly   tachypneic, unable to verbalize, only opens eyes.  HEENT:   Normocephalic and atraumatic.  Some temporal wasting.  CARDIOVASCULAR:  Tachycardic rate, regular rhythm.  RESPIRATORY:  Extremely rhonchorous predominantly in the right base with a   very coarse central breath sounds, tachypneic with respiratory rates in the   upper 30s.  ABDOMEN:  Soft, nondistended.  Positive bowel sounds.  EXTREMITIES:  Without edema.  NEUROLOGIC:  Patient opens eyes spontaneously, otherwise, not following   commands, not responding.  PSYCHIATRIC:  Unable to assess given clinical state.    LABORATORY DATA:  White count 11, H and H of 7.7 and 28, platelets 278, 10%   bands.  Chem panel:  Sodium 146, potassium 4.4, chloride 113, bicarbonate 13,   anion gap 20, glucose 418, BUN 59, creatinine 1.32, AST 11, ALT 35, alkaline   phosphatase 67, albumin 2.9, magnesium 2.8.  Lactic acid 12.7, troponin 0.02.    IMAGING:  Chest x-ray with right basilar infiltrates.    ASSESSMENT:  1.  Acute-on-chronic hypoxic respiratory failure, suspect secondary to   aspiration pneumonia.  2.  Septic shock secondary to pulmonary source.  3.  Acute kidney injury.  4.  Leukocytosis with bandemia.  5.  Chronic anemia.  6.  Hypernatremia.  7.  Severe anion gap metabolic acidosis.  8.  Lactic acidosis.  9.  Hyperglycemia.  10.  Right lower lobe pneumonia.  11.  History of type 2 diabetes.  12.  History of chronic hypertension, currently hypotensive.  13.  Chronic obstructive pulmonary disease.  14.  History of severe Alzheimer's.  15.  History of gastrointestinal bleed.  16.  Chronic history of dyslipidemia.  17.  Incomplete medical database.    PLAN:  Again this is a 77-year-old gentleman with multiple comorbidities   including severe Alzheimer's disease, presenting here with respiratory   distress that is quite severe and profound lactic acidosis with septic shock.    The patient is DNR/DNI, has severe underlying dementia.  Apparently, he is a   alcantara of the [facility].  Patient is currently in severe septic shock in severe    distress and unable to escalate therapy to more heroic measures including   intubation and mechanical ventilatory support.  Therefore, at this time, it is   felt that in best interest of the patient, transitioning to more comfort   approach utilizing medications such as morphine and Ativan to prevent further   suffering in this situation.  The patient's mortality in the setting of   inability to escalate therapies would be extremely high given his lactic acid   of 12.7.  His septic state is rapidly declining respiratory status.  A call   has been placed to the daughter to inform her, however, was unable to reach   given the number that was listed.  A call was placed to legal guardian and   informed them of the current state and they will work to also identify further   contact information of the daughter to inform her of his current state and   changes in health.    Total critical care time not including billable procedures 35 minutes.       ____________________________________     MD CEDRICK Haro / YANELIS    DD:  10/16/2017 23:30:20  DT:  10/17/2017 00:52:15    D#:  [id number]  Job#:  [id number]

## 2017-10-17 NOTE — DISCHARGE PLANNING
LAURIE received a call from pts public guardian Adams Rivera. LAURIE updated Adams on pts passing. Adams requested that the pt be sent to [facility]. Adams reported that he will update pts daughter. LAURIE updated STANISLAV Soto of [facility] choice. SW to remain available.

## 2017-10-17 NOTE — ED NOTES
Admitting MD to start comfort care orders after speaking with on-call guardian of pt, med administered per order, high flow NC d/c'd, all infusions d/c'd

## 2017-10-17 NOTE — ED NOTES
Dr. Carrasquillo notified of pt hypotension, orders received to bolus 2L LR and re-eval, pressure bags applied to infusions

## 2017-10-17 NOTE — PROGRESS NOTES
I spoke with pt guardian and informed them of patient clinical state and fact that he will not survive this episode. Attempted call to daughter with number in chart as well as number from guardian but neither number worked. Number I received from guardian was [phone].

## 2017-10-17 NOTE — ED NOTES
Pt arrives via EMS from SNF in respiratory distress, EMS reports pt ate dinner and vomited a large amount approx 1 hour ago, pt aspirated while vomiting, pt arrives with EMS giving bag valve mask respirations, pt has documented DNR with no mechanical ventilation, pt placed on high flow Nc, RT at bedside

## 2017-10-22 LAB
BACTERIA BLD CULT: NORMAL
BACTERIA BLD CULT: NORMAL
SIGNIFICANT IND 70042: NORMAL
SIGNIFICANT IND 70042: NORMAL
SITE SITE: NORMAL
SITE SITE: NORMAL
SOURCE SOURCE: NORMAL
SOURCE SOURCE: NORMAL
